# Patient Record
Sex: MALE | Race: WHITE | NOT HISPANIC OR LATINO | Employment: UNEMPLOYED | URBAN - METROPOLITAN AREA
[De-identification: names, ages, dates, MRNs, and addresses within clinical notes are randomized per-mention and may not be internally consistent; named-entity substitution may affect disease eponyms.]

---

## 2020-01-13 ENCOUNTER — CONVERSION ENCOUNTER (OUTPATIENT)
Dept: SURGERY | Facility: CLINIC | Age: 65
End: 2020-01-13

## 2020-01-13 ENCOUNTER — OFFICE VISIT CONVERTED (OUTPATIENT)
Dept: SURGERY | Facility: CLINIC | Age: 65
End: 2020-01-13
Attending: PHYSICIAN ASSISTANT

## 2020-01-14 ENCOUNTER — OFFICE VISIT CONVERTED (OUTPATIENT)
Dept: SURGERY | Facility: CLINIC | Age: 65
End: 2020-01-14
Attending: PHYSICIAN ASSISTANT

## 2020-01-14 ENCOUNTER — HOSPITAL ENCOUNTER (OUTPATIENT)
Dept: SURGERY | Facility: CLINIC | Age: 65
Discharge: HOME | End: 2020-01-14
Attending: PHYSICIAN ASSISTANT

## 2020-01-16 LAB
AMOXICILLIN+CLAV SUSC ISLT: >=32
BACTERIA UR CULT: ABNORMAL
CEFAZOLIN SUSC ISLT: >=64
CEFEPIME SUSC ISLT: <=1
CEFTAZIDIME SUSC ISLT: <=1
CEFTRIAXONE SUSC ISLT: <=1
CEFUROXIME ORAL SUSC ISLT: >=64
CEFUROXIME PARENTER SUSC ISLT: >=64
CIPROFLOXACIN SUSC ISLT: <=0.25
ERTAPENEM SUSC ISLT: <=0.5
GENTAMICIN SUSC ISLT: <=1
LEVOFLOXACIN SUSC ISLT: <=0.12
NITROFURANTOIN SUSC ISLT: 256
TETRACYCLINE SUSC ISLT: 4
TMP SMX SUSC ISLT: <=20
TOBRAMYCIN SUSC ISLT: <=1

## 2020-01-28 ENCOUNTER — HOSPITAL ENCOUNTER (OUTPATIENT)
Dept: SURGERY | Facility: CLINIC | Age: 65
Discharge: HOME | End: 2020-01-28
Attending: PHYSICIAN ASSISTANT

## 2020-01-28 ENCOUNTER — OFFICE VISIT CONVERTED (OUTPATIENT)
Dept: SURGERY | Facility: CLINIC | Age: 65
End: 2020-01-28
Attending: PHYSICIAN ASSISTANT

## 2020-01-28 ENCOUNTER — CONVERSION ENCOUNTER (OUTPATIENT)
Dept: SURGERY | Facility: CLINIC | Age: 65
End: 2020-01-28

## 2020-01-30 LAB — BACTERIA UR CULT: NORMAL

## 2020-02-26 ENCOUNTER — HOSPITAL ENCOUNTER (OUTPATIENT)
Dept: OTHER | Facility: HOSPITAL | Age: 65
Discharge: HOME | End: 2020-02-26
Attending: PHYSICIAN ASSISTANT

## 2020-02-27 LAB
PSA FREE MFR SERPL: 22 %
PSA FREE SERPL-MCNC: 0.44 NG/ML
PSA SERPL-MCNC: 2 NG/ML (ref 0–4)

## 2021-05-15 VITALS — RESPIRATION RATE: 16 BRPM | WEIGHT: 195 LBS | HEIGHT: 75 IN | BODY MASS INDEX: 24.25 KG/M2

## 2021-05-15 VITALS — RESPIRATION RATE: 14 BRPM | HEIGHT: 74 IN | WEIGHT: 203 LBS | BODY MASS INDEX: 26.05 KG/M2

## 2021-05-15 VITALS — HEIGHT: 75 IN | RESPIRATION RATE: 16 BRPM | BODY MASS INDEX: 24.29 KG/M2 | WEIGHT: 195.37 LBS

## 2022-10-03 PROCEDURE — 87077 CULTURE AEROBIC IDENTIFY: CPT | Performed by: EMERGENCY MEDICINE

## 2022-10-03 PROCEDURE — 87186 SC STD MICRODIL/AGAR DIL: CPT | Performed by: EMERGENCY MEDICINE

## 2022-10-03 PROCEDURE — 87086 URINE CULTURE/COLONY COUNT: CPT | Performed by: EMERGENCY MEDICINE

## 2022-10-07 PROCEDURE — 87086 URINE CULTURE/COLONY COUNT: CPT | Performed by: EMERGENCY MEDICINE

## 2022-11-16 ENCOUNTER — LAB (OUTPATIENT)
Dept: LAB | Facility: HOSPITAL | Age: 67
End: 2022-11-16

## 2022-11-16 ENCOUNTER — OFFICE VISIT (OUTPATIENT)
Dept: UROLOGY | Facility: CLINIC | Age: 67
End: 2022-11-16

## 2022-11-16 VITALS — WEIGHT: 208 LBS | HEIGHT: 74 IN | HEART RATE: 72 BPM | BODY MASS INDEX: 26.69 KG/M2

## 2022-11-16 DIAGNOSIS — R35.1 BENIGN PROSTATIC HYPERPLASIA WITH NOCTURIA: ICD-10-CM

## 2022-11-16 DIAGNOSIS — N40.1 BENIGN PROSTATIC HYPERPLASIA WITH NOCTURIA: ICD-10-CM

## 2022-11-16 DIAGNOSIS — R31.0 GROSS HEMATURIA: Primary | ICD-10-CM

## 2022-11-16 DIAGNOSIS — R31.0 GROSS HEMATURIA: ICD-10-CM

## 2022-11-16 LAB
BILIRUB BLD-MCNC: NEGATIVE MG/DL
CLARITY, POC: CLEAR
COLOR UR: ABNORMAL
EXPIRATION DATE: ABNORMAL
GLUCOSE UR STRIP-MCNC: NEGATIVE MG/DL
KETONES UR QL: ABNORMAL
LEUKOCYTE EST, POC: NEGATIVE
Lab: ABNORMAL
NITRITE UR-MCNC: NEGATIVE MG/ML
PH UR: 6 [PH] (ref 5–8)
PROT UR STRIP-MCNC: NEGATIVE MG/DL
PSA SERPL-MCNC: 2.86 NG/ML (ref 0–4)
RBC # UR STRIP: NEGATIVE /UL
SP GR UR: 1.02 (ref 1–1.03)
UROBILINOGEN UR QL: ABNORMAL

## 2022-11-16 PROCEDURE — 36415 COLL VENOUS BLD VENIPUNCTURE: CPT

## 2022-11-16 PROCEDURE — 99203 OFFICE O/P NEW LOW 30 MIN: CPT | Performed by: NURSE PRACTITIONER

## 2022-11-16 PROCEDURE — 87086 URINE CULTURE/COLONY COUNT: CPT | Performed by: NURSE PRACTITIONER

## 2022-11-16 PROCEDURE — 84153 ASSAY OF PSA TOTAL: CPT

## 2022-11-16 PROCEDURE — 81003 URINALYSIS AUTO W/O SCOPE: CPT | Performed by: NURSE PRACTITIONER

## 2022-11-16 PROCEDURE — 51798 US URINE CAPACITY MEASURE: CPT | Performed by: NURSE PRACTITIONER

## 2022-11-16 RX ORDER — TAMSULOSIN HYDROCHLORIDE 0.4 MG/1
1 CAPSULE ORAL DAILY
Qty: 90 CAPSULE | Refills: 0 | Status: SHIPPED | OUTPATIENT
Start: 2022-11-16 | End: 2023-02-07

## 2022-11-16 RX ORDER — MELOXICAM 15 MG/1
15 TABLET ORAL DAILY
COMMUNITY
Start: 2022-11-05

## 2022-11-17 LAB — SPECIMEN VOL SMN: 172 ML

## 2022-11-17 NOTE — PROGRESS NOTES
Chief Complaint: Urinary Tract Infection    Subjective      I have a UTI       History of Present Illness  Dipak Petty is a 67 y.o. male presents to [facility] for UTI.    Patient presents today on referral from Dr. Bhupendra Moses for uti.     Patient admits to urinary frequency and urgency.  Denies any dysuria.    Admits to gross hematuria.    Admits to Nocturia 2X/night.    Patient admits to a weak urinary stream associated with spitting.     Denies any penis or scrotum pain.  Admits to ED issues with initiating and maintaining. Uses sildenafil 20mg at bedtime as needed.     Denies take any blood thinners.    Denies any family history of prostate cancer.  Admits to family history of bladder cancer with his father.    Previous psa's;  12/19: 6.3  6/17: 1.4    Previous cx's:  10/22:100,000 E.coli   10/22: 100,000 Lactose     Results for orders placed or performed in visit on 11/16/22   Urine Culture - Urine, Urine, Clean Catch    Specimen: Urine, Clean Catch   Result Value Ref Range    Urine Culture No growth    Bladder Scan   Result Value Ref Range    Volume: 172    POC Urinalysis Dipstick, Automated    Specimen: Urine   Result Value Ref Range    Color Straw Yellow, Straw, Dark Yellow, Reyna    Clarity, UA Clear Clear    Specific Gravity  1.020 1.005 - 1.030    pH, Urine 6.0 5.0 - 8.0    Leukocytes Negative Negative    Nitrite, UA Negative Negative    Protein, POC Negative Negative mg/dL    Glucose, UA Negative Negative mg/dL    Ketones, UA Trace (A) Negative    Urobilinogen, UA 0.2 E.U./dL Normal, 0.2 E.U./dL    Bilirubin Negative Negative    Blood, UA Negative Negative    Lot Number [id number]     Expiration Date 2,023-11            Objective     Past Medical History:   Diagnosis Date   • Hypertension        Past Surgical History:   Procedure Laterality Date   • BACK SURGERY     • KNEE ARTHROPLASTY, PARTIAL REPLACEMENT Left        Outpatient Medications Marked as Taking for the  "11/16/22 encounter (Office Visit) with Nanci Hernandes APRN   Medication Sig Dispense Refill   • Aspirin Low Dose 81 MG chewable tablet Chew 81 mg Daily.     • B COMPLEX-BIOTIN-FA PO Take  by mouth.     • hydroCHLOROthiazide (HYDRODIURIL) 12.5 MG tablet Take 12.5 mg by mouth Daily.     • lisinopril (PRINIVIL,ZESTRIL) 40 MG tablet Take 1.5 tablets by mouth Daily.     • meloxicam (MOBIC) 15 MG tablet Take 15 mg by mouth Daily. with food         No Known Allergies     History reviewed. No pertinent family history.    Social History     Socioeconomic History   • Marital status:    Tobacco Use   • Smoking status: Some Days     Types: Pipe   • Smokeless tobacco: Never   Vaping Use   • Vaping Use: Never used   Substance and Sexual Activity   • Alcohol use: Not Currently   • Drug use: Never   • Sexual activity: Defer       Review of Systems   Constitutional: Negative for chills and fever.   Genitourinary: Positive for frequency, nocturia, erectile dysfunction and urgency. Negative for decreased urine volume, difficulty urinating, discharge, dysuria, flank pain, genital sores, hematuria, penile pain, penile swelling, scrotal swelling, testicular pain and urinary incontinence.        Vital Signs:   Pulse 72   Ht 188 cm (74\")   Wt 94.3 kg (208 lb)   BMI 26.71 kg/m²      Physical Exam  Vitals reviewed.   Constitutional:       General: He is not in acute distress.     Appearance: Normal appearance.   HENT:      Head: Normocephalic.   Cardiovascular:      Rate and Rhythm: Normal rate.   Pulmonary:      Effort: Pulmonary effort is normal.      Breath sounds: No stridor. No wheezing.   Abdominal:      Palpations: Abdomen is soft.      Tenderness: There is no guarding.   Genitourinary:     Comments: Bladder Scan interpretation     Estimation of residual urine via BVI 3000 Verathon Bladder Scan  MA/nurse performing: Crystal - CMA  Residual Urine: 172 ml  Indication: Gross hematuria  (primary encounter diagnosis)   Position: " Supine  Examination: Incremental scanning of the suprapubic area using 2.0 MHz transducer using copious amounts of acoustic gel.   Findings: An anechoic area was demonstrated which represented the bladder, with measurement of residual urine as noted. I inspected this myself. In that the residual urine was stable or insignificant, refer to plan for treatment and plan necessary at this time.         Musculoskeletal:         General: No swelling. Normal range of motion.   Skin:     General: Skin is warm and dry.      Coloration: Skin is not jaundiced.   Neurological:      General: No focal deficit present.      Mental Status: He is alert and oriented to person, place, and time.   Psychiatric:         Mood and Affect: Mood normal.         Thought Content: Thought content normal.          Result Review :          [x]  Laboratory  []  Radiology  []  Pathology  [x]  Microbiology  []  EKG/Telemetry   []  Cardiology/Vascular   [x]  Old records  Today I have reviewed Migdalia Bond's previous office notes.  I have also reviewed previous PSAs and urinalysis and cultures.     Assessment and Plan    Diagnoses and all orders for this visit:    1. Gross hematuria (Primary)  -     PSA Diagnostic; Future  -     Cystoscopy; Future  -     CT Abdomen Pelvis With & Without Contrast; Future  -     tamsulosin (FLOMAX) 0.4 MG capsule 24 hr capsule; Take 1 capsule by mouth Daily for 90 days.  Dispense: 90 capsule; Refill: 0  -     Urine Culture - Urine, Urine, Clean Catch  -     POC Urinalysis Dipstick, Automated  -     Bladder Scan    2. Benign prostatic hyperplasia with nocturia        Follow Up   Return for ct abd/pelvis; follow-up with me post imaging .     Gross painless hematuria-discussed with patient at length, warrants further work-up.    BPH with Luts- behavioral modifications including fluid management, limiting fluids prior to sleep, and voiding immediately prior to sleep.       Start flomax 0.4 mg tab; side effects discussed;  aware that it may take 3-4 months to have complete effect so encouraged patient to continue to take to ensure adequate trial of medication.    Patient was given instructions and counseling regarding his condition or for health maintenance advice. Please see specific information pulled into the AVS if appropriate.      The office note was dictated with the help of the dragon dictation system.

## 2022-11-18 LAB — BACTERIA SPEC AEROBE CULT: NO GROWTH

## 2022-12-06 ENCOUNTER — HOSPITAL ENCOUNTER (OUTPATIENT)
Dept: CT IMAGING | Facility: HOSPITAL | Age: 67
Discharge: HOME | End: 2022-12-06
Admitting: NURSE PRACTITIONER

## 2022-12-06 DIAGNOSIS — R31.0 GROSS HEMATURIA: ICD-10-CM

## 2022-12-06 LAB
CREAT BLDA-MCNC: 1.3 MG/DL
EGFRCR SERPLBLD CKD-EPI 2021: 61 ML/MIN/1.73

## 2022-12-06 PROCEDURE — 82565 ASSAY OF CREATININE: CPT

## 2022-12-06 PROCEDURE — 0 IOPAMIDOL PER 1 ML: Performed by: NURSE PRACTITIONER

## 2022-12-06 PROCEDURE — 74178 CT ABD&PLV WO CNTR FLWD CNTR: CPT

## 2022-12-06 RX ADMIN — IOPAMIDOL 100 ML: 755 INJECTION, SOLUTION INTRAVENOUS at 17:57

## 2022-12-09 ENCOUNTER — TELEPHONE (OUTPATIENT)
Dept: UROLOGY | Facility: CLINIC | Age: 67
End: 2022-12-09

## 2022-12-09 DIAGNOSIS — N28.89 RENAL MASS: Primary | ICD-10-CM

## 2022-12-09 NOTE — TELEPHONE ENCOUNTER
Pt is aware of MRI on 12/13/22 arrive at 6:30 pm for a 7 pm scan at [facility] and to be NPO 2 hours prior.

## 2022-12-13 ENCOUNTER — HOSPITAL ENCOUNTER (OUTPATIENT)
Dept: MRI IMAGING | Facility: HOSPITAL | Age: 67
Discharge: HOME | End: 2022-12-13
Admitting: NURSE PRACTITIONER

## 2022-12-13 DIAGNOSIS — N28.89 RENAL MASS: ICD-10-CM

## 2022-12-13 PROCEDURE — 74183 MRI ABD W/O CNTR FLWD CNTR: CPT

## 2022-12-13 PROCEDURE — 0 GADOBENATE DIMEGLUMINE 529 MG/ML SOLUTION: Performed by: NURSE PRACTITIONER

## 2022-12-13 PROCEDURE — A9577 INJ MULTIHANCE: HCPCS | Performed by: NURSE PRACTITIONER

## 2022-12-13 RX ADMIN — GADOBENATE DIMEGLUMINE 20 ML: 529 INJECTION, SOLUTION INTRAVENOUS at 19:00

## 2022-12-15 ENCOUNTER — TELEPHONE (OUTPATIENT)
Dept: UROLOGY | Facility: CLINIC | Age: 67
End: 2022-12-15

## 2022-12-15 NOTE — TELEPHONE ENCOUNTER
Pt's wife called to ask if April was going to send in another medication for his prostate. He thought that he was told that she would be sending in a new med when she called him with the results of the MRI.

## 2022-12-16 RX ORDER — FINASTERIDE 5 MG/1
5 TABLET, FILM COATED ORAL DAILY
Qty: 34 TABLET | Refills: 2 | Status: SHIPPED | OUTPATIENT
Start: 2022-12-16 | End: 2023-03-09

## 2022-12-16 NOTE — TELEPHONE ENCOUNTER
April called and talked to the Pt about his MRI and following up with a Cysto with dr. Tristan. Please call the Pt to schedule that.

## 2023-02-03 DIAGNOSIS — R31.0 GROSS HEMATURIA: ICD-10-CM

## 2023-02-07 RX ORDER — TAMSULOSIN HYDROCHLORIDE 0.4 MG/1
CAPSULE ORAL
Qty: 90 CAPSULE | Refills: 0 | Status: SHIPPED | OUTPATIENT
Start: 2023-02-07

## 2023-02-09 PROBLEM — R31.0 GROSS HEMATURIA: Status: ACTIVE | Noted: 2023-02-09

## 2023-02-09 NOTE — PROGRESS NOTES
Chief Complaint: Urologic complaint    Subjective         History of Present Illness  Dipak Petty is a 67 y.o. male    ED  BPH  Gross hematuria      Currently on Flomax 0.4 and finasteride 5 mg daily.    These are helping.  Nocturia x2.  No straining.      No GH      Patient has used sildenafil in the past, they did help.  No side effects.  He was using 100 mg      He did have some gross hematuria with UTI last year.    Patient's been on finasteride since 10/22      12/22 MRI abdomen with and without -simple cyst right kidney accounting for abnormal area on CT.  No suspicious masses.  12/7/2022 CT abdomen/pelvis with and without - delayed phase retirement down - 2 x 2.3 cm right kidney decreased density likely complicated cyst or small mass.      PVR    2/23      123  12/22     172          Father had bladder cancer,   no history of urologic surgery.    No cardiopulmonary history.  Smokes a pipe.  ASA 81          PSA    12/22   2.8  12/20   2.0        Objective     Past Medical History:   Diagnosis Date   • Hypertension        Past Surgical History:   Procedure Laterality Date   • BACK SURGERY     • KNEE ARTHROPLASTY, PARTIAL REPLACEMENT Left          Current Outpatient Medications:   •  Aspirin Low Dose 81 MG chewable tablet, Chew 81 mg Daily., Disp: , Rfl:   •  B COMPLEX-BIOTIN-FA PO, Take  by mouth., Disp: , Rfl:   •  Cholecalciferol 10 MCG (400 UNIT) capsule, Take  by mouth., Disp: , Rfl:   •  ciprofloxacin (CIPRO) 500 MG tablet, Take 1 tablet by mouth 2 (Two) Times a Day., Disp: 20 tablet, Rfl: 0  •  finasteride (PROSCAR) 5 MG tablet, Take 1 tablet by mouth Daily for 90 days., Disp: 34 tablet, Rfl: 2  •  hydroCHLOROthiazide (HYDRODIURIL) 12.5 MG tablet, Take 12.5 mg by mouth Daily., Disp: , Rfl:   •  lisinopril (PRINIVIL,ZESTRIL) 40 MG tablet, Take 1.5 tablets by mouth Daily., Disp: , Rfl:   •  meloxicam (MOBIC) 15 MG tablet, Take 15 mg by mouth Daily. with food, Disp: , Rfl:   •  phenazopyridine (PYRIDIUM)  200 MG tablet, Take 1 tablet by mouth 3 (Three) Times a Day As Needed for Bladder Spasms., Disp: 12 tablet, Rfl: 0  •  sulfamethoxazole-trimethoprim (BACTRIM DS,SEPTRA DS) 800-160 MG per tablet, Take 1 tablet by mouth 2 (Two) Times a Day., Disp: 10 tablet, Rfl: 0  •  tamsulosin (FLOMAX) 0.4 MG capsule 24 hr capsule, Take 1 capsule by mouth Every Night., Disp: 30 capsule, Rfl: 0  •  tamsulosin (FLOMAX) 0.4 MG capsule 24 hr capsule, TAKE 1 CAPSULE BY MOUTH EVERY DAY, Disp: 90 capsule, Rfl: 0    No Known Allergies     No family history on file.    Social History     Socioeconomic History   • Marital status:    Tobacco Use   • Smoking status: Some Days     Types: Pipe   • Smokeless tobacco: Never   Vaping Use   • Vaping Use: Never used   Substance and Sexual Activity   • Alcohol use: Not Currently   • Drug use: Never   • Sexual activity: Defer       Vital Signs:   There were no vitals taken for this visit.     Physical exam    Alert and orient x3  Well appearing, well developed, in no acute distress   Unlabored respirations  Nontender/nondistended    Normal circumcised phallus without mass or tenderness    Grossly oriented to person, place and time, judgment is intact, normal mood and affect    Results for orders placed or performed during the hospital encounter of 12/06/22   POC Creatinine    Specimen: Blood   Result Value Ref Range    Creatinine 1.30 mg/dL    eGFR 61.0 >60.0 mL/min/1.73      Bladder Scan interpretation 02/10/2023    Estimation of residual urine via BVI 3000 Verathon Bladder Scan  MA/nurse performing: Luis Felipe PHILLIPS  Residual Urine: 123 ml  Indication: Gross hematuria    Benign prostatic hyperplasia with lower urinary tract symptoms, symptom details unspecified    Erectile dysfunction due to arterial insufficiency   Position: Supine  Examination: Incremental scanning of the suprapubic area using 2.0 MHz transducer using copious amounts of acoustic gel.   Findings: An anechoic area was demonstrated  which represented the bladder, with measurement of residual urine as noted. I inspected this myself. In that the residual urine was stable or insignificant, refer to plan for treatment and plan necessary at this time.              Assessment and Plan    Diagnoses and all orders for this visit:    1. Gross hematuria (Primary)  -     Cystoscopy         Procedures       Urinalysis was checked today and was negative for signs of infection      Cytoscopy Procedure:     Procedure: Flexible cytoscope was passed per urethra into the bladder without difficulty after proper consent. The bladder was inspected in a systematic meridian fashion.     4 cm prostate    Moderate trabeculation throughout with a large cone-shaped dome with heavy trabeculations in this.      No pathology      There were no tumors, lesions, stones, or other abnormalities noted within the bladder. Of note, there was no increased vascularity as well. Both ureteral orifices were identified and were normal in appearance. The flexible cytoscope was removed. The patient tolerated the procedure well.         ED    Patient has used sildenafil in the past , prescription written today,  risk and benefits discussed.  Patient understands if he has erection greater than 4 hours he should go to the emergency room or risk never having erection again.        Patient also counseled not to take tamsulosin within 4 hours of sildenafil as this could be detriment to his health.      BPH      Continue Flomax 0.4 and finasteride 5.  Patient not bothered currently but still retaining a little urine we discussed this today we did briefly discuss TURP.  Risk and benefits discussed.  At this time patient like to stay on medication we will readdress later this year        Gross hematuria    CT/MRI and cystoscopy okay.  Patient given reassurance no signs of pathology      Follow-up in 10/23 which be 1 year of being on finasteride      PVR follow-up

## 2023-02-10 ENCOUNTER — PROCEDURE VISIT (OUTPATIENT)
Dept: UROLOGY | Facility: CLINIC | Age: 68
End: 2023-02-10
Payer: COMMERCIAL

## 2023-02-10 DIAGNOSIS — R31.0 GROSS HEMATURIA: Primary | ICD-10-CM

## 2023-02-10 DIAGNOSIS — N40.1 BENIGN PROSTATIC HYPERPLASIA WITH LOWER URINARY TRACT SYMPTOMS, SYMPTOM DETAILS UNSPECIFIED: ICD-10-CM

## 2023-02-10 DIAGNOSIS — N52.01 ERECTILE DYSFUNCTION DUE TO ARTERIAL INSUFFICIENCY: ICD-10-CM

## 2023-02-10 LAB — SPECIMEN VOL 24H UR: NORMAL L

## 2023-02-10 PROCEDURE — 51798 US URINE CAPACITY MEASURE: CPT | Performed by: UROLOGY

## 2023-02-10 PROCEDURE — 52000 CYSTOURETHROSCOPY: CPT | Performed by: UROLOGY

## 2023-02-10 PROCEDURE — 99214 OFFICE O/P EST MOD 30 MIN: CPT | Performed by: UROLOGY

## 2023-02-10 RX ORDER — SILDENAFIL CITRATE 20 MG/1
TABLET ORAL
Qty: 30 TABLET | Refills: 10 | Status: SHIPPED | OUTPATIENT
Start: 2023-02-10

## 2023-03-09 RX ORDER — FINASTERIDE 5 MG/1
TABLET, FILM COATED ORAL
Qty: 30 TABLET | Refills: 2 | Status: SHIPPED | OUTPATIENT
Start: 2023-03-09

## 2023-05-13 DIAGNOSIS — R31.0 GROSS HEMATURIA: ICD-10-CM

## 2023-05-18 RX ORDER — TAMSULOSIN HYDROCHLORIDE 0.4 MG/1
CAPSULE ORAL
Qty: 90 CAPSULE | Refills: 0 | Status: SHIPPED | OUTPATIENT
Start: 2023-05-18

## 2023-06-13 RX ORDER — FINASTERIDE 5 MG/1
TABLET, FILM COATED ORAL
Qty: 30 TABLET | Refills: 2 | Status: SHIPPED | OUTPATIENT
Start: 2023-06-13

## 2023-08-14 DIAGNOSIS — N39.0 ACUTE UTI: ICD-10-CM

## 2023-08-18 RX ORDER — TAMSULOSIN HYDROCHLORIDE 0.4 MG/1
CAPSULE ORAL
Qty: 90 CAPSULE | Refills: 0 | Status: SHIPPED | OUTPATIENT
Start: 2023-08-18

## 2023-09-16 DIAGNOSIS — N40.1 BENIGN PROSTATIC HYPERPLASIA WITH LOWER URINARY TRACT SYMPTOMS, SYMPTOM DETAILS UNSPECIFIED: Primary | ICD-10-CM

## 2023-09-21 RX ORDER — FINASTERIDE 5 MG/1
TABLET, FILM COATED ORAL
Qty: 30 TABLET | Refills: 2 | Status: SHIPPED | OUTPATIENT
Start: 2023-09-21

## 2023-10-11 NOTE — PROGRESS NOTES
Chief Complaint: Urologic complaint    Subjective         History of Present Illness  Dipak Petty is a 68 y.o. male      ED  BPH  Gross hematuria      3 UTIs this year.    9/23 UTI-[facility] through [facility]-do not have records        2/23 cystoscopy - 50 g prostate,  4 cm prostate, moderate trabeculation.  Large cone-shaped dome with heavy trabeculations in this.  No pathology    on Flomax 0.4 and finasteride 5 mg daily.    Voiding okay as far as he can tell, no straining.  Nocturia x2-3    Patient is worried about erections    No GH/dysuria      PVR    10/23     000  2/23       123  12/22     172    10/23 IPSS 20, QOL 4      Patient has used sildenafil in the past, they did help.  No side effects.  He was using 100 mg -not working as well as it used to.      Previous         gross hematuria with UTI last year.    Patient's been on finasteride since 10/22      12/22 MRI abdomen with and without -simple cyst right kidney accounting for abnormal area on CT.  No suspicious masses.  12/7/2022 CT abdomen/pelvis with and without - delayed phase [facility] down - 2 x 2.3 cm right kidney decreased density likely complicated cyst or small mass.    Father had bladder cancer,   no history of urologic surgery.    No cardiopulmonary history.  Smokes a pipe.  ASA 83        PSA    12/22   2.8  12/20   2.0        Objective     Past Medical History:   Diagnosis Date    Hypertension        Past Surgical History:   Procedure Laterality Date    BACK SURGERY      KNEE ARTHROPLASTY, PARTIAL REPLACEMENT Left          Current Outpatient Medications:     Aspirin Low Dose 81 MG chewable tablet, Chew 81 mg Daily., Disp: , Rfl:     B COMPLEX-BIOTIN-FA PO, Take  by mouth., Disp: , Rfl:     Cholecalciferol 10 MCG (400 UNIT) capsule, Take  by mouth., Disp: , Rfl:     ciprofloxacin (CIPRO) 500 MG tablet, Take 1 tablet by mouth 2 (Two) Times a Day., Disp: 20 tablet, Rfl: 0    finasteride (PROSCAR) 5 MG tablet, TAKE 1 TABLET BY MOUTH EVERY DAY,  Disp: 30 tablet, Rfl: 2    hydroCHLOROthiazide (HYDRODIURIL) 12.5 MG tablet, Take 12.5 mg by mouth Daily., Disp: , Rfl:     lisinopril (PRINIVIL,ZESTRIL) 40 MG tablet, Take 1.5 tablets by mouth Daily., Disp: , Rfl:     meloxicam (MOBIC) 15 MG tablet, Take 15 mg by mouth Daily. with food, Disp: , Rfl:     phenazopyridine (PYRIDIUM) 200 MG tablet, Take 1 tablet by mouth 3 (Three) Times a Day As Needed for Bladder Spasms., Disp: 12 tablet, Rfl: 0    sulfamethoxazole-trimethoprim (BACTRIM DS,SEPTRA DS) 800-160 MG per tablet, Take 1 tablet by mouth 2 (Two) Times a Day., Disp: 10 tablet, Rfl: 0    tamsulosin (FLOMAX) 0.4 MG capsule 24 hr capsule, TAKE 1 CAPSULE BY MOUTH EVERY DAY, Disp: 90 capsule, Rfl: 0    No Known Allergies     No family history on file.    Social History     Socioeconomic History    Marital status:    Tobacco Use    Smoking status: Some Days     Types: Pipe    Smokeless tobacco: Never   Vaping Use    Vaping Use: Never used   Substance and Sexual Activity    Alcohol use: Not Currently    Drug use: Never    Sexual activity: Defer       Vital Signs:   There were no vitals taken for this visit.         Results for orders placed or performed in visit on 02/10/23   Bladder Scan   Result Value Ref Range    Volume 123ml           Bladder Scan interpretation 10/13/2023    Estimation of residual urine via BVI 3000 Verathon Bladder Scan  MA/nurse performing: Gricelda MARTÍNEZ  Residual Urine: 0 ml  Indication: Benign prostatic hyperplasia with lower urinary tract symptoms, symptom details unspecified    Erectile dysfunction due to arterial insufficiency    Gross hematuria   Position: Supine  Examination: Incremental scanning of the suprapubic area using 2.0 MHz transducer using copious amounts of acoustic gel.   Findings: An anechoic area was demonstrated which represented the bladder, with measurement of residual urine as noted. I inspected this myself. In that the residual urine was stable or insignificant,  refer to plan for treatment and plan necessary at this time.            Assessment and Plan    Diagnoses and all orders for this visit:    1. Benign prostatic hyperplasia with lower urinary tract symptoms, symptom details unspecified (Primary)    2. Erectile dysfunction due to arterial insufficiency    3. Gross hematuria              ED    Patient wanted to try something different we will go ahead and try him on tadalafil 20 mg 1 tab as needed since requested risk and benefits discussed.  Patient understands to get erection for greater than 4 hours he should go to emergency room and was found to have erection good.          BPH      Continue Flomax 0.4 and finasteride 5.        Discussed Aquablation today.  Discussed 1% risk of incontinence   risks and benefits were discussed including bleeding, infection and damage to the urinary system.  We also discussed the risk of anesthesia up to and including death.  Patient voiced understanding and would like to proceed.

## 2023-10-13 ENCOUNTER — PREP FOR SURGERY (OUTPATIENT)
Dept: OTHER | Facility: HOSPITAL | Age: 68
End: 2023-10-13
Payer: COMMERCIAL

## 2023-10-13 ENCOUNTER — OFFICE VISIT (OUTPATIENT)
Dept: UROLOGY | Facility: CLINIC | Age: 68
End: 2023-10-13
Payer: COMMERCIAL

## 2023-10-13 VITALS — HEIGHT: 74 IN | WEIGHT: 208.4 LBS | RESPIRATION RATE: 16 BRPM | BODY MASS INDEX: 26.75 KG/M2

## 2023-10-13 DIAGNOSIS — N52.01 ERECTILE DYSFUNCTION DUE TO ARTERIAL INSUFFICIENCY: ICD-10-CM

## 2023-10-13 DIAGNOSIS — N40.1 BPH WITH OBSTRUCTION/LOWER URINARY TRACT SYMPTOMS: Primary | ICD-10-CM

## 2023-10-13 DIAGNOSIS — R31.0 GROSS HEMATURIA: Primary | ICD-10-CM

## 2023-10-13 DIAGNOSIS — N13.8 BPH WITH OBSTRUCTION/LOWER URINARY TRACT SYMPTOMS: Primary | ICD-10-CM

## 2023-10-13 DIAGNOSIS — N40.1 BENIGN PROSTATIC HYPERPLASIA WITH LOWER URINARY TRACT SYMPTOMS, SYMPTOM DETAILS UNSPECIFIED: ICD-10-CM

## 2023-10-13 LAB
BILIRUB BLD-MCNC: NEGATIVE MG/DL
CLARITY, POC: CLEAR
COLOR UR: YELLOW
EXPIRATION DATE: ABNORMAL
GLUCOSE UR STRIP-MCNC: NEGATIVE MG/DL
KETONES UR QL: NEGATIVE
LEUKOCYTE EST, POC: NEGATIVE
Lab: ABNORMAL
NITRITE UR-MCNC: NEGATIVE MG/ML
PH UR: 7 [PH] (ref 5–8)
PROT UR STRIP-MCNC: ABNORMAL MG/DL
RBC # UR STRIP: NEGATIVE /UL
SP GR UR: 1.01 (ref 1–1.03)
SPECIMEN VOL 24H UR: 0 L
UROBILINOGEN UR QL: ABNORMAL

## 2023-10-13 RX ORDER — LEVOFLOXACIN 5 MG/ML
500 INJECTION, SOLUTION INTRAVENOUS ONCE
OUTPATIENT
Start: 2023-10-13 | End: 2023-10-13

## 2023-10-13 RX ORDER — SODIUM CHLORIDE 9 MG/ML
100 INJECTION, SOLUTION INTRAVENOUS CONTINUOUS
OUTPATIENT
Start: 2023-10-13

## 2023-10-13 RX ORDER — SODIUM CHLORIDE 0.9 % (FLUSH) 0.9 %
3 SYRINGE (ML) INJECTION EVERY 12 HOURS SCHEDULED
OUTPATIENT
Start: 2023-10-13

## 2023-10-13 RX ORDER — SODIUM CHLORIDE 9 MG/ML
40 INJECTION, SOLUTION INTRAVENOUS AS NEEDED
OUTPATIENT
Start: 2023-10-13

## 2023-10-13 RX ORDER — SODIUM CHLORIDE 0.9 % (FLUSH) 0.9 %
10 SYRINGE (ML) INJECTION AS NEEDED
OUTPATIENT
Start: 2023-10-13

## 2023-10-13 RX ORDER — TADALAFIL 20 MG/1
20 TABLET ORAL AS NEEDED
Qty: 5 TABLET | Refills: 5 | Status: SHIPPED | OUTPATIENT
Start: 2023-10-13

## 2023-11-21 DIAGNOSIS — N39.0 ACUTE UTI: ICD-10-CM

## 2023-11-22 ENCOUNTER — PRE-ADMISSION TESTING (OUTPATIENT)
Dept: PREADMISSION TESTING | Facility: HOSPITAL | Age: 68
End: 2023-11-22
Payer: COMMERCIAL

## 2023-11-22 ENCOUNTER — TELEPHONE (OUTPATIENT)
Dept: UROLOGY | Facility: CLINIC | Age: 68
End: 2023-11-22
Payer: COMMERCIAL

## 2023-11-22 VITALS
DIASTOLIC BLOOD PRESSURE: 74 MMHG | SYSTOLIC BLOOD PRESSURE: 136 MMHG | HEIGHT: 75 IN | WEIGHT: 211.2 LBS | OXYGEN SATURATION: 98 % | RESPIRATION RATE: 16 BRPM | TEMPERATURE: 98.2 F | BODY MASS INDEX: 26.26 KG/M2 | HEART RATE: 62 BPM

## 2023-11-22 DIAGNOSIS — Z01.818 PREOP TESTING: Primary | ICD-10-CM

## 2023-11-22 DIAGNOSIS — N40.1 BENIGN PROSTATIC HYPERPLASIA WITH LOWER URINARY TRACT SYMPTOMS, SYMPTOM DETAILS UNSPECIFIED: ICD-10-CM

## 2023-11-22 LAB
ANION GAP SERPL CALCULATED.3IONS-SCNC: 8.9 MMOL/L (ref 5–15)
BUN SERPL-MCNC: 23 MG/DL (ref 8–23)
BUN/CREAT SERPL: 18.5 (ref 7–25)
CALCIUM SPEC-SCNC: 9.2 MG/DL (ref 8.6–10.5)
CHLORIDE SERPL-SCNC: 103 MMOL/L (ref 98–107)
CO2 SERPL-SCNC: 26.1 MMOL/L (ref 22–29)
CREAT SERPL-MCNC: 1.24 MG/DL (ref 0.76–1.27)
EGFRCR SERPLBLD CKD-EPI 2021: 64.1 ML/MIN/1.73
GLUCOSE SERPL-MCNC: 87 MG/DL (ref 65–99)
POTASSIUM SERPL-SCNC: 4.1 MMOL/L (ref 3.5–5.2)
QT INTERVAL: 395 MS
QTC INTERVAL: 412 MS
SODIUM SERPL-SCNC: 138 MMOL/L (ref 136–145)

## 2023-11-22 PROCEDURE — 93005 ELECTROCARDIOGRAM TRACING: CPT

## 2023-11-22 PROCEDURE — 87086 URINE CULTURE/COLONY COUNT: CPT

## 2023-11-22 PROCEDURE — 36415 COLL VENOUS BLD VENIPUNCTURE: CPT

## 2023-11-22 PROCEDURE — 80048 BASIC METABOLIC PNL TOTAL CA: CPT

## 2023-11-22 RX ORDER — TAMSULOSIN HYDROCHLORIDE 0.4 MG/1
CAPSULE ORAL
Qty: 90 CAPSULE | Refills: 0 | Status: SHIPPED | OUTPATIENT
Start: 2023-11-22

## 2023-11-22 NOTE — DISCHARGE INSTRUCTIONS
IMPORTANT INSTRUCTIONS - PRE-ADMISSION TESTING  DO NOT EAT OR CHEW anything after midnight the night before your procedure.    You may have CLEAR liquids up to __2____ hours prior to ARRIVAL time.   Take the following medications the morning of your procedure with JUST A SIP OF WATER:       FLOMAX, PROSCAR    DO NOT BRING your medications to the hospital with you, UNLESS something has changed since your PRE-Admission Testing appointment.  Hold all vitamins, supplements, and NSAIDS (Non- steroidal anti-inflammatory meds) for one week prior to surgery (you MAY take Tylenol or Acetaminophen).                STOP 12-29-23: VIT B COMPLEX   If you are diabetic, check your blood sugar the morning of your procedure. If it is less than 70 or if you are feeling symptomatic, call the following number for further instructions: [phone].  Use your inhalers/nebulizers as usual, the morning of your procedure. BRING YOUR INHALERS with you.   Bring your CPAP or BIPAP to hospital, ONLY IF YOU WILL BE SPENDING THE NIGHT.   Make sure you have a ride home and have someone who will stay with you the day of your procedure after you go home.  If you have any questions, please call your Pre-Admission Testing Nurse, ___VIK BULLARD_ at [phone].   Per anesthesia request, do not smoke for 24 hours before your procedure or as instructed by your surgeon.      SURGERY 11-7-23 ELEVATOR A, THIRD FLOOR  WILL CALL THE ARRIVAL TIME AFTER 1PM THE DAY PRIOR TO SURGERY  FOLLOW ANY INSTRUCTIONS ANY INSTRUCTIONS FROM SURGEON'S OFFICE.  PER SURGEON OFFICE STOP ASA 1 WEEK PRIOR TO SURGERY LAST DOSE 11-29-23    PREOPERATIVE (BEFORE SURGERY)              BATHING INSTRUCTIONS  Instructions:    You will need to shower 1 separate times utilizing the soap provided; at the times indicated   below:     12-6-23 WED PM        AND   12-7-23 THUR AM     Wash your hair and face with normal shampoo and soap, rinse it well before using the surgical soap.      In the  MEDICATIONS:  · See Medication List (bring to your doctor appointments).  · Other:      VACCINES:  Most Recent Immunizations   Administered Date(s) Administered   • Influenza, Unspecified Formulation 11/12/2014       ACTIVITY:  · Weigh yourself daily (first thing in the morning, with same amount of clothes on) unless told otherwise by your doctor.  · Continue activity as you were in the hospital, slowly increase to what you were doing previously.  · Up as desired / no restrictions.    SMOKING:  · Avoid all tobacco products and secondhand smoke.  · Smoking Cessation Counseling offered.  · [place] Toll Free Quit Line: [phone]    DIET:  · Limit salt and salty foods unless told otherwise by your doctor.  · No Restrictions    · Trouble breathing or chest pain - CALL 911    CONTACT YOUR DOCTOR IF:  · You have symptoms that are not \"normal\" for you.  · You have new or worse symptoms or pain, not relieved by medicine or rest.  · Temperature greater than 101 degrees F, chills or flu like symptoms.  · You gain more than 3 pounds in 2 days.  · Increased swelling, redness or drainage at IV site.    REFERRALS (Type/Agency/Phone):  · Other:      shower, wet the skin completely with water from your neck to your feet. Apply the cleanser to your   body ONLY FROM THE NECK TO YOUR FEET.     Do NOT USE THE CLEANSER ON YOUR FACE, HEAD, OR GENITAL (PRIVATE) AREAS.   Keep it out of your eyes, ears, and mouth because of the risk of injury to those areas.      Scrub with a clean washcloth for each bath utilizing the soap provided from the top of your body to the   bottom starting at the neck area.      Pay close attention to your armpits, groin area, and the site of surgery.      Wash your body gently for 5 minutes. Stand outside the stream or turn off the water while scrubbing your   body. Do NOT wash with your regular soap after the surgical cleanser is used.      RINSE THE CLEANSER OFF COMPLETELY with plenty of water. Rinse the area again thoroughly.      Dry off with a clean towel. The surgical soap can cause dryness; however do NOT APPLY LOTION,   CREAM, POWDER, and/or DEODORANT AFTER SHOWERING.     Be sure to where clean clothes after showering.      Ensure CLEAN BED LINENS AFTER FIRST wash with the surgical soap. - WED PM     NO PETS ALLOWED IN THE BED with you after utilizing the surgical soap.    Clear Liquid Diet        Find out when you need to start a clear liquid diet.   Think of “clear liquids” as anything you could read a newspaper through. This includes things like water, broth, sports drinks, or tea WITHOUT any kind of milk or cream.           Once you are told to start a clear liquid diet, only drink these things until 2 hours before arrival to the hospital or when the hospital says to stop. Total volume limitation: 8 oz.       Clear liquids you CAN drink:   Water   Clear broth: beef, chicken, vegetable, or bone broth with nothing in it   Gatorade   Lemonade or Ramon-aid   Soda   Tea, coffee (NO cream or honey)   Jell-O (without fruit)   Popsicles (without fruit or cream)   Italian ices   Juice without pulp: apple, white, grape   You may use salt,  pepper, and sugar  NO RED  NO NOODLES    Do NOT drink:   Milk or cream   Soy milk, almond milk, coconut milk, or other non-dairy drinks and   creamers   Milkshakes or smoothies   Tomato juice   Orange juice   Grapefruit juice   Cream soups or any other than broth         Clear Liquid Diet:  Do NOT eat any solid food.  Do NOT eat or suck on mints or candy.  Do NOT chew gum.  Do NOT drink thick liquids like milk or juice with pulp in it.  Do NOT add milk, cream, or anything like soy milk or almond milk to coffee or tea.

## 2023-11-22 NOTE — TELEPHONE ENCOUNTER
VIK CALLED FROM [facility].  PATIENT IS SCHEDULED FOR AQUABLATION ON 12/07/23.  HE NEEDS TO KNOW WHEN TO STOP HIS 81 MG ASPIRIN.

## 2023-11-22 NOTE — NURSING NOTE
CALLED DR COFFMAN OFFICE, NOTIFIED SAUNDRA OF ABNORMAL UA (TRACE PROTEIN), ALSO ASKED WHEN TO STOP ASA FOR SURGERY   AWAITING RESPONSE.

## 2023-11-23 LAB — BACTERIA SPEC AEROBE CULT: NO GROWTH

## 2023-11-30 ENCOUNTER — TRANSCRIBE ORDERS (OUTPATIENT)
Dept: LAB | Facility: HOSPITAL | Age: 68
End: 2023-11-30
Payer: COMMERCIAL

## 2023-11-30 ENCOUNTER — LAB (OUTPATIENT)
Dept: LAB | Facility: HOSPITAL | Age: 68
End: 2023-11-30
Payer: COMMERCIAL

## 2023-11-30 DIAGNOSIS — J30.89 OTHER ALLERGIC RHINITIS: ICD-10-CM

## 2023-11-30 DIAGNOSIS — J30.1 ALLERGIC RHINITIS DUE TO POLLEN, UNSPECIFIED SEASONALITY: ICD-10-CM

## 2023-11-30 DIAGNOSIS — J30.1 ALLERGIC RHINITIS DUE TO POLLEN, UNSPECIFIED SEASONALITY: Primary | ICD-10-CM

## 2023-11-30 LAB
ALBUMIN SERPL-MCNC: 4.1 G/DL (ref 3.5–5.2)
ALBUMIN/GLOB SERPL: 1.3 G/DL
ALP SERPL-CCNC: 85 U/L (ref 39–117)
ALT SERPL W P-5'-P-CCNC: 17 U/L (ref 1–41)
ANION GAP SERPL CALCULATED.3IONS-SCNC: 11 MMOL/L (ref 5–15)
AST SERPL-CCNC: 25 U/L (ref 1–40)
BASOPHILS # BLD AUTO: 0.04 10*3/MM3 (ref 0–0.2)
BASOPHILS NFR BLD AUTO: 0.7 % (ref 0–1.5)
BILIRUB SERPL-MCNC: <0.2 MG/DL (ref 0–1.2)
BUN SERPL-MCNC: 26 MG/DL (ref 8–23)
BUN/CREAT SERPL: 17.6 (ref 7–25)
C4 SERPL-MCNC: 26 MG/DL (ref 14–44)
CALCIUM SPEC-SCNC: 9.7 MG/DL (ref 8.6–10.5)
CHLORIDE SERPL-SCNC: 104 MMOL/L (ref 98–107)
CO2 SERPL-SCNC: 27 MMOL/L (ref 22–29)
CREAT SERPL-MCNC: 1.48 MG/DL (ref 0.76–1.27)
DEPRECATED RDW RBC AUTO: 45.2 FL (ref 37–54)
EGFRCR SERPLBLD CKD-EPI 2021: 51.9 ML/MIN/1.73
EOSINOPHIL # BLD AUTO: 0.15 10*3/MM3 (ref 0–0.4)
EOSINOPHIL NFR BLD AUTO: 2.6 % (ref 0.3–6.2)
ERYTHROCYTE [DISTWIDTH] IN BLOOD BY AUTOMATED COUNT: 13.1 % (ref 12.3–15.4)
ERYTHROCYTE [SEDIMENTATION RATE] IN BLOOD: 7 MM/HR (ref 0–20)
GLOBULIN UR ELPH-MCNC: 3.2 GM/DL
GLUCOSE SERPL-MCNC: 90 MG/DL (ref 65–99)
HCT VFR BLD AUTO: 40.4 % (ref 37.5–51)
HGB BLD-MCNC: 13.7 G/DL (ref 13–17.7)
IMM GRANULOCYTES # BLD AUTO: 0.01 10*3/MM3 (ref 0–0.05)
IMM GRANULOCYTES NFR BLD AUTO: 0.2 % (ref 0–0.5)
LYMPHOCYTES # BLD AUTO: 1.39 10*3/MM3 (ref 0.7–3.1)
LYMPHOCYTES NFR BLD AUTO: 23.7 % (ref 19.6–45.3)
MCH RBC QN AUTO: 31.8 PG (ref 26.6–33)
MCHC RBC AUTO-ENTMCNC: 33.9 G/DL (ref 31.5–35.7)
MCV RBC AUTO: 93.7 FL (ref 79–97)
MONOCYTES # BLD AUTO: 0.4 10*3/MM3 (ref 0.1–0.9)
MONOCYTES NFR BLD AUTO: 6.8 % (ref 5–12)
NEUTROPHILS NFR BLD AUTO: 3.87 10*3/MM3 (ref 1.7–7)
NEUTROPHILS NFR BLD AUTO: 66 % (ref 42.7–76)
NRBC BLD AUTO-RTO: 0 /100 WBC (ref 0–0.2)
PLATELET # BLD AUTO: 296 10*3/MM3 (ref 140–450)
PMV BLD AUTO: 10.8 FL (ref 6–12)
POTASSIUM SERPL-SCNC: 4.1 MMOL/L (ref 3.5–5.2)
PROT SERPL-MCNC: 7.3 G/DL (ref 6–8.5)
RBC # BLD AUTO: 4.31 10*6/MM3 (ref 4.14–5.8)
SODIUM SERPL-SCNC: 142 MMOL/L (ref 136–145)
WBC NRBC COR # BLD AUTO: 5.86 10*3/MM3 (ref 3.4–10.8)

## 2023-11-30 PROCEDURE — 83520 IMMUNOASSAY QUANT NOS NONAB: CPT

## 2023-11-30 PROCEDURE — 36415 COLL VENOUS BLD VENIPUNCTURE: CPT

## 2023-11-30 PROCEDURE — 85652 RBC SED RATE AUTOMATED: CPT

## 2023-11-30 PROCEDURE — 86160 COMPLEMENT ANTIGEN: CPT

## 2023-11-30 PROCEDURE — 85025 COMPLETE CBC W/AUTO DIFF WBC: CPT

## 2023-11-30 PROCEDURE — 86161 COMPLEMENT/FUNCTION ACTIVITY: CPT

## 2023-11-30 PROCEDURE — 80053 COMPREHEN METABOLIC PANEL: CPT

## 2023-12-03 LAB — TRYPTASE SERPL-MCNC: 7.3 UG/L (ref 2.2–13.2)

## 2023-12-04 LAB — C1INH ACT/NOR SERPL: 77 %MEAN NORMAL

## 2023-12-05 LAB
QT INTERVAL: 395 MS
QTC INTERVAL: 412 MS

## 2023-12-07 ENCOUNTER — ANESTHESIA EVENT (OUTPATIENT)
Dept: PERIOP | Facility: HOSPITAL | Age: 68
End: 2023-12-07
Payer: COMMERCIAL

## 2023-12-07 ENCOUNTER — ANESTHESIA (OUTPATIENT)
Dept: PERIOP | Facility: HOSPITAL | Age: 68
End: 2023-12-07
Payer: COMMERCIAL

## 2023-12-07 ENCOUNTER — HOSPITAL ENCOUNTER (OUTPATIENT)
Facility: HOSPITAL | Age: 68
Discharge: HOME | End: 2023-12-08
Attending: UROLOGY | Admitting: UROLOGY
Payer: COMMERCIAL

## 2023-12-07 DIAGNOSIS — N40.1 BPH WITH OBSTRUCTION/LOWER URINARY TRACT SYMPTOMS: ICD-10-CM

## 2023-12-07 DIAGNOSIS — N13.8 BPH WITH OBSTRUCTION/LOWER URINARY TRACT SYMPTOMS: ICD-10-CM

## 2023-12-07 PROCEDURE — 0421T PR TRANSURETHRAL WATERJET ABLATION PROSTATE COMPL: CPT | Performed by: UROLOGY

## 2023-12-07 PROCEDURE — 25010000002 ONDANSETRON PER 1 MG: Performed by: NURSE ANESTHETIST, CERTIFIED REGISTERED

## 2023-12-07 PROCEDURE — 25810000003 LACTATED RINGERS PER 1000 ML: Performed by: ANESTHESIOLOGY

## 2023-12-07 PROCEDURE — 25010000002 PROPOFOL 10 MG/ML EMULSION: Performed by: NURSE ANESTHETIST, CERTIFIED REGISTERED

## 2023-12-07 PROCEDURE — 25010000002 DEXAMETHASONE PER 1 MG: Performed by: NURSE ANESTHETIST, CERTIFIED REGISTERED

## 2023-12-07 PROCEDURE — 88305 TISSUE EXAM BY PATHOLOGIST: CPT | Performed by: UROLOGY

## 2023-12-07 PROCEDURE — 25010000002 MIDAZOLAM PER 1MG: Performed by: ANESTHESIOLOGY

## 2023-12-07 PROCEDURE — 25010000002 SUGAMMADEX 200 MG/2ML SOLUTION: Performed by: NURSE ANESTHETIST, CERTIFIED REGISTERED

## 2023-12-07 PROCEDURE — 25010000002 FENTANYL CITRATE (PF) 50 MCG/ML SOLUTION: Performed by: NURSE ANESTHETIST, CERTIFIED REGISTERED

## 2023-12-07 PROCEDURE — 25010000002 KETOROLAC TROMETHAMINE PER 15 MG: Performed by: NURSE ANESTHETIST, CERTIFIED REGISTERED

## 2023-12-07 PROCEDURE — 25810000003 SODIUM CHLORIDE 0.9 % SOLUTION: Performed by: UROLOGY

## 2023-12-07 PROCEDURE — 94799 UNLISTED PULMONARY SVC/PX: CPT

## 2023-12-07 PROCEDURE — 25010000002 LEVOFLOXACIN PER 250 MG: Performed by: UROLOGY

## 2023-12-07 PROCEDURE — C2596 PROBE, ROBOTIC, WATER-JET: HCPCS | Performed by: UROLOGY

## 2023-12-07 RX ORDER — CETIRIZINE HYDROCHLORIDE 10 MG/1
10 TABLET ORAL DAILY
Status: DISCONTINUED | OUTPATIENT
Start: 2023-12-07 | End: 2023-12-08 | Stop reason: HOSPADM

## 2023-12-07 RX ORDER — SODIUM CHLORIDE 0.9 % (FLUSH) 0.9 %
3 SYRINGE (ML) INJECTION EVERY 12 HOURS SCHEDULED
Status: DISCONTINUED | OUTPATIENT
Start: 2023-12-07 | End: 2023-12-07 | Stop reason: HOSPADM

## 2023-12-07 RX ORDER — PROMETHAZINE HYDROCHLORIDE 25 MG/1
25 SUPPOSITORY RECTAL ONCE AS NEEDED
Status: DISCONTINUED | OUTPATIENT
Start: 2023-12-07 | End: 2023-12-07 | Stop reason: HOSPADM

## 2023-12-07 RX ORDER — ROCURONIUM BROMIDE 10 MG/ML
INJECTION, SOLUTION INTRAVENOUS AS NEEDED
Status: DISCONTINUED | OUTPATIENT
Start: 2023-12-07 | End: 2023-12-07 | Stop reason: SURG

## 2023-12-07 RX ORDER — HYDROCHLOROTHIAZIDE 12.5 MG/1
12.5 TABLET ORAL DAILY
Status: DISCONTINUED | OUTPATIENT
Start: 2023-12-08 | End: 2023-12-08 | Stop reason: HOSPADM

## 2023-12-07 RX ORDER — SODIUM CHLORIDE 9 MG/ML
100 INJECTION, SOLUTION INTRAVENOUS CONTINUOUS
Status: DISCONTINUED | OUTPATIENT
Start: 2023-12-07 | End: 2023-12-07

## 2023-12-07 RX ORDER — ONDANSETRON 2 MG/ML
4 INJECTION INTRAMUSCULAR; INTRAVENOUS EVERY 6 HOURS PRN
Status: DISCONTINUED | OUTPATIENT
Start: 2023-12-07 | End: 2023-12-08 | Stop reason: HOSPADM

## 2023-12-07 RX ORDER — DEXAMETHASONE SODIUM PHOSPHATE 4 MG/ML
INJECTION, SOLUTION INTRA-ARTICULAR; INTRALESIONAL; INTRAMUSCULAR; INTRAVENOUS; SOFT TISSUE AS NEEDED
Status: DISCONTINUED | OUTPATIENT
Start: 2023-12-07 | End: 2023-12-07 | Stop reason: SURG

## 2023-12-07 RX ORDER — EPINEPHRINE 0.3 MG/.3ML
0.3 INJECTION SUBCUTANEOUS ONCE AS NEEDED
Status: DISCONTINUED | OUTPATIENT
Start: 2023-12-07 | End: 2023-12-08 | Stop reason: HOSPADM

## 2023-12-07 RX ORDER — MEPERIDINE HYDROCHLORIDE 25 MG/ML
12.5 INJECTION INTRAMUSCULAR; INTRAVENOUS; SUBCUTANEOUS
Status: DISCONTINUED | OUTPATIENT
Start: 2023-12-07 | End: 2023-12-07 | Stop reason: HOSPADM

## 2023-12-07 RX ORDER — MELOXICAM 15 MG/1
15 TABLET ORAL DAILY
Status: ON HOLD | COMMUNITY
Start: 2023-11-24 | End: 2023-12-07

## 2023-12-07 RX ORDER — LISINOPRIL 20 MG/1
60 TABLET ORAL DAILY
Status: DISCONTINUED | OUTPATIENT
Start: 2023-12-08 | End: 2023-12-08 | Stop reason: HOSPADM

## 2023-12-07 RX ORDER — CETIRIZINE HYDROCHLORIDE 10 MG/1
1 TABLET ORAL DAILY
COMMUNITY
Start: 2023-12-04

## 2023-12-07 RX ORDER — EPHEDRINE SULFATE 50 MG/ML
INJECTION, SOLUTION INTRAVENOUS AS NEEDED
Status: DISCONTINUED | OUTPATIENT
Start: 2023-12-07 | End: 2023-12-07 | Stop reason: SURG

## 2023-12-07 RX ORDER — PROMETHAZINE HYDROCHLORIDE 12.5 MG/1
25 TABLET ORAL ONCE AS NEEDED
Status: DISCONTINUED | OUTPATIENT
Start: 2023-12-07 | End: 2023-12-07 | Stop reason: HOSPADM

## 2023-12-07 RX ORDER — OXYCODONE HYDROCHLORIDE 5 MG/1
5 TABLET ORAL
Status: DISCONTINUED | OUTPATIENT
Start: 2023-12-07 | End: 2023-12-07 | Stop reason: HOSPADM

## 2023-12-07 RX ORDER — PROPOFOL 10 MG/ML
VIAL (ML) INTRAVENOUS AS NEEDED
Status: DISCONTINUED | OUTPATIENT
Start: 2023-12-07 | End: 2023-12-07 | Stop reason: SURG

## 2023-12-07 RX ORDER — FENTANYL CITRATE 50 UG/ML
INJECTION, SOLUTION INTRAMUSCULAR; INTRAVENOUS AS NEEDED
Status: DISCONTINUED | OUTPATIENT
Start: 2023-12-07 | End: 2023-12-07 | Stop reason: SURG

## 2023-12-07 RX ORDER — MONTELUKAST SODIUM 10 MG/1
10 TABLET ORAL NIGHTLY
Status: DISCONTINUED | OUTPATIENT
Start: 2023-12-07 | End: 2023-12-08 | Stop reason: HOSPADM

## 2023-12-07 RX ORDER — SODIUM CHLORIDE, SODIUM LACTATE, POTASSIUM CHLORIDE, CALCIUM CHLORIDE 600; 310; 30; 20 MG/100ML; MG/100ML; MG/100ML; MG/100ML
9 INJECTION, SOLUTION INTRAVENOUS CONTINUOUS PRN
Status: DISCONTINUED | OUTPATIENT
Start: 2023-12-07 | End: 2023-12-07 | Stop reason: HOSPADM

## 2023-12-07 RX ORDER — EPINEPHRINE 0.3 MG/.3ML
0.3 INJECTION SUBCUTANEOUS SEE ADMIN INSTRUCTIONS
COMMUNITY
Start: 2023-12-04

## 2023-12-07 RX ORDER — SODIUM CHLORIDE 9 MG/ML
40 INJECTION, SOLUTION INTRAVENOUS AS NEEDED
Status: DISCONTINUED | OUTPATIENT
Start: 2023-12-07 | End: 2023-12-08 | Stop reason: HOSPADM

## 2023-12-07 RX ORDER — LEVOFLOXACIN 5 MG/ML
500 INJECTION, SOLUTION INTRAVENOUS ONCE
Status: COMPLETED | OUTPATIENT
Start: 2023-12-07 | End: 2023-12-07

## 2023-12-07 RX ORDER — ONDANSETRON 2 MG/ML
INJECTION INTRAMUSCULAR; INTRAVENOUS AS NEEDED
Status: DISCONTINUED | OUTPATIENT
Start: 2023-12-07 | End: 2023-12-07 | Stop reason: SURG

## 2023-12-07 RX ORDER — SODIUM CHLORIDE 0.9 % (FLUSH) 0.9 %
10 SYRINGE (ML) INJECTION EVERY 12 HOURS SCHEDULED
Status: DISCONTINUED | OUTPATIENT
Start: 2023-12-07 | End: 2023-12-08 | Stop reason: HOSPADM

## 2023-12-07 RX ORDER — ONDANSETRON 2 MG/ML
4 INJECTION INTRAMUSCULAR; INTRAVENOUS ONCE AS NEEDED
Status: DISCONTINUED | OUTPATIENT
Start: 2023-12-07 | End: 2023-12-07 | Stop reason: HOSPADM

## 2023-12-07 RX ORDER — MONTELUKAST SODIUM 10 MG/1
10 TABLET ORAL
COMMUNITY
Start: 2023-12-04

## 2023-12-07 RX ORDER — ACETAMINOPHEN 650 MG/1
650 SUPPOSITORY RECTAL EVERY 4 HOURS PRN
Status: DISCONTINUED | OUTPATIENT
Start: 2023-12-07 | End: 2023-12-08 | Stop reason: HOSPADM

## 2023-12-07 RX ORDER — SODIUM CHLORIDE 0.9 % (FLUSH) 0.9 %
10 SYRINGE (ML) INJECTION AS NEEDED
Status: DISCONTINUED | OUTPATIENT
Start: 2023-12-07 | End: 2023-12-08 | Stop reason: HOSPADM

## 2023-12-07 RX ORDER — ONDANSETRON 4 MG/1
4 TABLET, FILM COATED ORAL EVERY 6 HOURS PRN
Status: DISCONTINUED | OUTPATIENT
Start: 2023-12-07 | End: 2023-12-08 | Stop reason: HOSPADM

## 2023-12-07 RX ORDER — SODIUM CHLORIDE 9 MG/ML
60 INJECTION, SOLUTION INTRAVENOUS CONTINUOUS
Status: DISCONTINUED | OUTPATIENT
Start: 2023-12-07 | End: 2023-12-08 | Stop reason: HOSPADM

## 2023-12-07 RX ORDER — HYDRALAZINE HYDROCHLORIDE 20 MG/ML
10 INJECTION INTRAMUSCULAR; INTRAVENOUS EVERY 6 HOURS PRN
Status: DISCONTINUED | OUTPATIENT
Start: 2023-12-07 | End: 2023-12-08 | Stop reason: HOSPADM

## 2023-12-07 RX ORDER — OXYCODONE HYDROCHLORIDE 5 MG/1
5 TABLET ORAL EVERY 4 HOURS PRN
Status: DISCONTINUED | OUTPATIENT
Start: 2023-12-07 | End: 2023-12-08 | Stop reason: HOSPADM

## 2023-12-07 RX ORDER — MAGNESIUM HYDROXIDE 1200 MG/15ML
LIQUID ORAL AS NEEDED
Status: DISCONTINUED | OUTPATIENT
Start: 2023-12-07 | End: 2023-12-07 | Stop reason: HOSPADM

## 2023-12-07 RX ORDER — LIDOCAINE HYDROCHLORIDE 20 MG/ML
INJECTION, SOLUTION EPIDURAL; INFILTRATION; INTRACAUDAL; PERINEURAL AS NEEDED
Status: DISCONTINUED | OUTPATIENT
Start: 2023-12-07 | End: 2023-12-07 | Stop reason: SURG

## 2023-12-07 RX ORDER — ACETAMINOPHEN 325 MG/1
650 TABLET ORAL EVERY 4 HOURS PRN
Status: DISCONTINUED | OUTPATIENT
Start: 2023-12-07 | End: 2023-12-08 | Stop reason: HOSPADM

## 2023-12-07 RX ORDER — ACETAMINOPHEN 500 MG
1000 TABLET ORAL ONCE
Status: COMPLETED | OUTPATIENT
Start: 2023-12-07 | End: 2023-12-07

## 2023-12-07 RX ORDER — DOCUSATE SODIUM 100 MG/1
100 CAPSULE, LIQUID FILLED ORAL 2 TIMES DAILY PRN
Status: DISCONTINUED | OUTPATIENT
Start: 2023-12-07 | End: 2023-12-08 | Stop reason: HOSPADM

## 2023-12-07 RX ORDER — KETOROLAC TROMETHAMINE 30 MG/ML
INJECTION, SOLUTION INTRAMUSCULAR; INTRAVENOUS AS NEEDED
Status: DISCONTINUED | OUTPATIENT
Start: 2023-12-07 | End: 2023-12-07 | Stop reason: SURG

## 2023-12-07 RX ORDER — SODIUM CHLORIDE 0.9 % (FLUSH) 0.9 %
10 SYRINGE (ML) INJECTION AS NEEDED
Status: DISCONTINUED | OUTPATIENT
Start: 2023-12-07 | End: 2023-12-07 | Stop reason: HOSPADM

## 2023-12-07 RX ORDER — MIDAZOLAM HYDROCHLORIDE 2 MG/2ML
2 INJECTION, SOLUTION INTRAMUSCULAR; INTRAVENOUS ONCE
Status: COMPLETED | OUTPATIENT
Start: 2023-12-07 | End: 2023-12-07

## 2023-12-07 RX ORDER — SODIUM CHLORIDE 9 MG/ML
40 INJECTION, SOLUTION INTRAVENOUS AS NEEDED
Status: DISCONTINUED | OUTPATIENT
Start: 2023-12-07 | End: 2023-12-07 | Stop reason: HOSPADM

## 2023-12-07 RX ADMIN — CETIRIZINE HYDROCHLORIDE 10 MG: 10 TABLET, FILM COATED ORAL at 15:40

## 2023-12-07 RX ADMIN — SODIUM CHLORIDE 60 ML/HR: 9 INJECTION, SOLUTION INTRAVENOUS at 15:40

## 2023-12-07 RX ADMIN — MIDAZOLAM HYDROCHLORIDE 2 MG: 1 INJECTION, SOLUTION INTRAMUSCULAR; INTRAVENOUS at 09:02

## 2023-12-07 RX ADMIN — EPHEDRINE SULFATE 20 MG: 50 INJECTION INTRAVENOUS at 10:39

## 2023-12-07 RX ADMIN — DEXAMETHASONE SODIUM PHOSPHATE 4 MG: 4 INJECTION, SOLUTION INTRAMUSCULAR; INTRAVENOUS at 10:08

## 2023-12-07 RX ADMIN — KETOROLAC TROMETHAMINE 30 MG: 30 INJECTION, SOLUTION INTRAMUSCULAR; INTRAVENOUS at 10:08

## 2023-12-07 RX ADMIN — EPHEDRINE SULFATE 10 MG: 50 INJECTION INTRAVENOUS at 10:09

## 2023-12-07 RX ADMIN — ONDANSETRON 4 MG: 2 INJECTION INTRAMUSCULAR; INTRAVENOUS at 10:08

## 2023-12-07 RX ADMIN — FENTANYL CITRATE 100 MCG: 50 INJECTION, SOLUTION INTRAMUSCULAR; INTRAVENOUS at 09:59

## 2023-12-07 RX ADMIN — Medication 10 ML: at 22:24

## 2023-12-07 RX ADMIN — ROCURONIUM BROMIDE 50 MG: 50 INJECTION INTRAVENOUS at 09:59

## 2023-12-07 RX ADMIN — LIDOCAINE HYDROCHLORIDE 80 MG: 20 INJECTION, SOLUTION EPIDURAL; INFILTRATION; INTRACAUDAL; PERINEURAL at 09:59

## 2023-12-07 RX ADMIN — LEVOFLOXACIN 500 MG: 5 INJECTION, SOLUTION INTRAVENOUS at 09:52

## 2023-12-07 RX ADMIN — ACETAMINOPHEN 1000 MG: 500 TABLET ORAL at 08:36

## 2023-12-07 RX ADMIN — SODIUM CHLORIDE, POTASSIUM CHLORIDE, SODIUM LACTATE AND CALCIUM CHLORIDE 9 ML/HR: 600; 310; 30; 20 INJECTION, SOLUTION INTRAVENOUS at 08:36

## 2023-12-07 RX ADMIN — Medication 10 ML: at 15:40

## 2023-12-07 RX ADMIN — SUGAMMADEX 200 MG: 100 INJECTION, SOLUTION INTRAVENOUS at 10:50

## 2023-12-07 RX ADMIN — MONTELUKAST 10 MG: 10 TABLET, FILM COATED ORAL at 22:23

## 2023-12-07 RX ADMIN — PROPOFOL 150 MG: 10 INJECTION, EMULSION INTRAVENOUS at 09:59

## 2023-12-07 NOTE — OP NOTE
TRANSURETHRAL PROSTATE AQUABLATION  Procedure Report    Patient Name:  Dipak Petty  YOB: 1955    Date of Surgery:  12/7/2023      Pre-op Diagnosis:   BPH with obstruction/lower urinary tract symptoms [N40.1, N13.8]       Postop diagnosis:    Same    Procedure/CPT® Codes:      Procedure(s):    Aquablation    Staff:  Surgeon(s):  Jerry Tristan MD    Assistant: Loretta Wang CSA    Anesthesia: General    Estimated Blood Loss: 7 mL    Implants:    Nothing was implanted during the procedure    Specimen:          Specimens       ID Source Type Tests Collected By Collected At Frozen?    A Prostate Tissue TISSUE PATHOLOGY EXAM   Jerry Tristan MD 12/7/23 1028 No    Description: Prostate Chips    This specimen was not marked as sent.        Complications: none        Findings    50 g prostate  Total past time 7 minutes 2 seconds        Brief description of procedure    After appropriate identification patient was brought to the operating room.  General anesthesia was achieved without complication.  Patient was placed in doral lithotomy position and prepped and draped in normal sterile fashion.  A multidisciplinary timeout was undertaken documenting the correct patient site and procedure.     The TRUS stepper was mounted to the articulating arm and secured to the OR bed.  The ultrasound probe was attached to the stepper.  The ultrasound probe was aligned and confirmation made that the prostate is centered in line using both transverse and sagittal views.  The bladder neck, verumontanum and central/transition zones were identified.    Prostate was measured in 3 dimensions.  Prostate anatomy was noted.    The 24 Swazi aqua beam handpiece was inserted into the prostatic urethra and a complete cystoscopic evaluation was performed by inspecting the prostate bladder and identified in the location of the verumontanum/external sphincter.  The aqua beam handpiece was secured to the hand piece  articulating arm.  Confirmed alignment of the Aquablation handpiece and TRUS probe to be parallel and collinear.  Confirmation that the aqua beam nozzle is centered and anterior of the bladder neck or the median lobe.  The cystoscope was then retracted to visualize the verumontanum and the external sphincter and the cystoscope tip was positioned just proximal to the external sphincter.  Reconfirmed alignment of the TRUS probe with the aqua beam handpiece and compression applied with a TRUS probe.  Horizontal alignment of the handpiece wired and nausea was performed.    The Aquablation treatment zones were planned utilizing real-time TRUS to visualize the contour of the prostate and the depth and radial angles of resection were defined in the transverse view.  In the sagittal view, the aqua beam nozzle is identified in position registered with software.  The treatment contours were then adjusted to conform to the intended resection margins.  The median lobe, bladder neck and verumontanum were marked and confirmed in the treatment contour.    The Aquablation treatment was then started following the resection contour confirmed under the ultrasound guidance.    Total Aquablation resection time:      Once the Aquablation resection was complete a resectoscope was placed in with a large bipolar loop and clot evacuation was performed with Ellick evacuator.  All clots removed.  All tissue removed.    Cautery was used for pinpoint hemostasis until the urine was light pink.  22 Azerbaijani three-way Emmanuel catheter placed into the bladder without issue with 30 cc of sterile water placed in the balloon.  This was placed to continuous bladder irrigation and straight drainage.  Ultrasound probe was removed.  Patient tolerated procedure well and was brought to the recovery area without any issue.    Assistant: Loretta Wang CSA  was responsible for performing the following activities: Held/Positioned Camera and their skilled  assistance was necessary for the success of this case.    Jerry Tristan MD     Date: 12/7/2023  Time: 11:03 EST

## 2023-12-07 NOTE — LETTER
December 8, 2023     Patient: Dipak Petty   YOB: 1955   Date of Visit: 10/13/2023       To Whom It May Concern:    It is my medical opinion that Dipak Petty should remain out of work until January 1st, 2024.           Sincerely,        MD Marcela Pierce RN

## 2023-12-07 NOTE — ANESTHESIA PREPROCEDURE EVALUATION
" Anesthesia Evaluation     Patient summary reviewed and Nursing notes reviewed   no history of anesthetic complications:   NPO Solid Status: > 8 hours  NPO Liquid Status: > 2 hours           Airway   Mallampati: I  TM distance: >3 FB  Neck ROM: full  No difficulty expected  Dental    (+) edentulous    Pulmonary - normal exam    breath sounds clear to auscultation  (+) a smoker Current,  Cardiovascular - normal exam  Exercise tolerance: good (4-7 METS)    Rhythm: regular  Rate: normal    (+) hypertension      Neuro/Psych- negative ROS  GI/Hepatic/Renal/Endo - negative ROS     Musculoskeletal     Abdominal    Substance History      OB/GYN          Other        ROS/Med Hx Other: >4METS, HX HTN, CARDS OV 5/10/23 HX HTN, \"DOING WELL\", F/U 1 YR. KT                Anesthesia Plan    ASA 2     general     (Patient understands anesthesia not responsible for dental damage.)  intravenous induction     Anesthetic plan, risks, benefits, and alternatives have been provided, discussed and informed consent has been obtained with: patient.    Plan discussed with CRNA.    CODE STATUS:         "

## 2023-12-07 NOTE — ANESTHESIA POSTPROCEDURE EVALUATION
Patient: Dipak Petty    Procedure Summary       Date: 12/07/23 Room / Location: [facility] OR 02 / [facility] MAIN OR    Anesthesia Start: 0951 Anesthesia Stop: 1101    Procedure: Aquablation Diagnosis:       BPH with obstruction/lower urinary tract symptoms      (BPH with obstruction/lower urinary tract symptoms [N40.1, N13.8])    Surgeons: Jerry Tristan MD Provider:     Anesthesia Type: general ASA Status: 2            Anesthesia Type: general    Vitals  Vitals Value Taken Time   /67 12/07/23 1150   Temp 36.2 °C (97.1 °F) 12/07/23 1145   Pulse 55 12/07/23 1151   Resp 16 12/07/23 1145   SpO2 95 % 12/07/23 1151   Vitals shown include unfiled device data.        Post Anesthesia Care and Evaluation    Patient location during evaluation: bedside  Patient participation: complete - patient participated  Level of consciousness: awake  Pain management: adequate    Airway patency: patent  PONV Status: none  Cardiovascular status: acceptable  Respiratory status: acceptable  Hydration status: acceptable    Comments: An Anesthesiologist personally participated in the most demanding procedures (including induction and emergence if applicable) in the anesthesia plan, monitored the course of anesthesia administration at frequent intervals and remained physically present and available for immediate diagnosis and treatment of emergencies.

## 2023-12-07 NOTE — H&P
[facility]   UROLOGY HISTORY AND PHYSICAL    Patient Name: Dipak Petty  : 1955  MRN: [id number]  Primary Care Physician:  Bhupendra Moses MD  Date of admission: 2023    Subjective   Subjective     Chief Complaint:       BPH      HPI:    Dipak Petty is a 68 y.o. male BPH    No change in H&P    Review of Systems     10 systems reviewed and are negative other than what is listed in HPI    Personal History     Past Medical History:   Diagnosis Date    BPH (benign prostatic hyperplasia)     Hypertension     FOLLOWS DR JUAREZ. DENIED CP-SOB       Past Surgical History:   Procedure Laterality Date    BACK SURGERY      JOINT REPLACEMENT Left     left tkr    KNEE ARTHROSCOPY Left     x3       Family History: family history is not on file. Otherwise pertinent FHx was reviewed and not pertinent to current issue.    Social History:  reports that he has been smoking pipe. He has never used smokeless tobacco. He reports that he does not currently use alcohol. He reports that he does not use drugs.    Home Medications:  B Complex-Biotin-FA, EPINEPHrine, aspirin, cetirizine, finasteride, hydroCHLOROthiazide, lisinopril, meloxicam, montelukast, tadalafil, and tamsulosin      Allergies:  Allergies   Allergen Reactions    Nitrofuran Derivatives Angioedema       Objective   Objective     Vitals:   Temp:  [97.4 °F (36.3 °C)] 97.4 °F (36.3 °C)  Heart Rate:  [63] 63  Resp:  [18] 18  BP: (131)/(69) 131/69  Physical Exam    Constitutional: Awake, alert    Respiratory: Clear to auscultation bilaterally, nonlabored respirations    Cardiovascular: RRR, no murmurs, rubs, or gallops, palpable pedal pulses bilaterally   Gastrointestinal: Positive bowel sounds, soft, nontender, nondistended   Musculoskeletal: No bilateral ankle edema, no clubbing or cyanosis to extremities     Result Review    Result Review:  I have personally reviewed the results from the time of this admission to 2023 08:05 EST and agree with  these findings:  []  Laboratory  []  Microbiology  []  Radiology  []  EKG/Telemetry   []  Cardiology/Vascular   []  Pathology  []  Old records  []  Other:    Assessment & Plan   Assessment / Plan     Brief Patient Summary:  Dipak Petty is a 68 y.o. male     Active Hospital Problems:  Active Hospital Problems    Diagnosis     **BPH with obstruction/lower urinary tract symptoms        Plan:       Aquablation.   Risks and benefits were discussed including bleeding, infection and damage to the urinary system.  We also discussed the risk of anesthesia up to and including death.  Patient voiced understanding and would like to proceed.    Electronically signed by Jerry Tristan MD, 12/07/23, 8:05 AM EST.

## 2023-12-08 VITALS
DIASTOLIC BLOOD PRESSURE: 69 MMHG | HEIGHT: 73 IN | BODY MASS INDEX: 27.32 KG/M2 | WEIGHT: 206.13 LBS | TEMPERATURE: 97.6 F | HEART RATE: 52 BPM | RESPIRATION RATE: 16 BRPM | SYSTOLIC BLOOD PRESSURE: 136 MMHG | OXYGEN SATURATION: 93 %

## 2023-12-08 PROCEDURE — 25810000003 SODIUM CHLORIDE 0.9 % SOLUTION: Performed by: UROLOGY

## 2023-12-08 RX ORDER — HYDROCODONE BITARTRATE AND ACETAMINOPHEN 5; 325 MG/1; MG/1
1 TABLET ORAL EVERY 6 HOURS PRN
Qty: 12 TABLET | Refills: 0 | Status: SHIPPED | OUTPATIENT
Start: 2023-12-08

## 2023-12-08 RX ADMIN — SODIUM CHLORIDE 60 ML/HR: 9 INJECTION, SOLUTION INTRAVENOUS at 04:01

## 2023-12-08 RX ADMIN — Medication 10 ML: at 08:44

## 2023-12-08 RX ADMIN — LISINOPRIL 60 MG: 20 TABLET ORAL at 08:44

## 2023-12-08 RX ADMIN — HYDROCHLOROTHIAZIDE 12.5 MG: 12.5 TABLET ORAL at 08:44

## 2023-12-08 RX ADMIN — CETIRIZINE HYDROCHLORIDE 10 MG: 10 TABLET, FILM COATED ORAL at 08:44

## 2023-12-08 NOTE — PLAN OF CARE
Goal Outcome Evaluation:                      Education provided to both pt and spouse regarding catheter care. Pt/spouse questions answered. Discharge information and catheter care information given to pt/spouse in writing and in person. AVS given to pt. Pt given necessary supplies in order to care for nieto.

## 2023-12-08 NOTE — DISCHARGE SUMMARY
[facility]         DISCHARGE SUMMARY    Patient Name: Dipak Petty  : 1955  MRN: [id number]    Date of Admission: 2023  Date of Discharge:  23   Primary Care Physician: Bhupendra Moses MD    Consults       No orders found for last 30 day(s).            Surgical Procedures Since Admission:  Procedure(s):  Aquablation  Surgeon:  Jerry Tristan MD  Status:  1 Day Post-Op  -------------------      Presenting Problem:   BPH with obstruction/lower urinary tract symptoms [N40.1, N13.8]    Active and Resolved Hospital Problems:  Active Hospital Problems    Diagnosis POA    **BPH with obstruction/lower urinary tract symptoms [N40.1, N13.8] Unknown      Resolved Hospital Problems   No resolved problems to display.         Hospital Course     Hospital Course:  Dipak Petty is a 68 y.o. male admitted for continuous bladder irrigation weaned off overnight discharged home with catheter without issue the following morning    Day of Discharge     Vital Signs:  Temp:  [96.9 °F (36.1 °C)-98.1 °F (36.7 °C)] 97.6 °F (36.4 °C)  Heart Rate:  [51-86] 52  Resp:  [16-18] 16  BP: (108-136)/(61-74) 136/69  Flow (L/min):  [2] 2  Output by Drain (mL) 23 0701 - 23 1900 23 1901 - 23 0626 Range Total   Continuous Bladder Irrigation Triple-lumen 22 Fr 1650  1650         Pertinent  and/or Most Recent Results     LAB RESULTS:                  Brief Urine Lab Results  (Last result in the past 365 days)        Color   Clarity   Blood   Leuk Est   Nitrite   Protein   CREAT   Urine HCG        10/13/23 0951 Yellow   Clear   Negative   Negative   Negative   Trace                 Microbiology Results (last 10 days)       ** No results found for the last 240 hours. **             Labs Pending at Discharge:  Pending Labs       Order Current Status    Tissue Pathology Exam Collected (23 1028)            Discharge Details        Discharge Medications        New Medications         Instructions Start Date   HYDROcodone-acetaminophen 5-325 MG per tablet  Commonly known as: NORCO   1 tablet, Oral, Every 6 Hours PRN             Continue These Medications        Instructions Start Date   B COMPLEX-BIOTIN-FA PO   1 tablet, Oral, Daily      cetirizine 10 MG tablet  Commonly known as: zyrTEC   1 tablet, Oral, Daily      EPINEPHrine 0.3 MG/0.3ML solution auto-injector injection  Commonly known as: EPIPEN   0.3 mg, Intramuscular, See Admin Instructions, for allergic reaction      hydroCHLOROthiazide 12.5 MG tablet  Commonly known as: HYDRODIURIL   12.5 mg, Oral, Daily      lisinopril 40 MG tablet  Commonly known as: PRINIVIL,ZESTRIL   40 mg, Oral, Daily      montelukast 10 MG tablet  Commonly known as: SINGULAIR   10 mg, Oral, Every Night at Bedtime      tadalafil 20 MG tablet  Commonly known as: Cialis   20 mg, Oral, As Needed             Stop These Medications      Aspirin Low Dose 81 MG chewable tablet  Generic drug: aspirin     tamsulosin 0.4 MG capsule 24 hr capsule  Commonly known as: FLOMAX              Allergies   Allergen Reactions    Nitrofuran Derivatives Angioedema       Condition:    Good, stable    Physical exam:    Constitutional: Well-appearing, well-developed,  in no acute distress    Respiratory: Unlabored breathing    Abdominal: Nontender, nondistended, no rigidity or guarding, no hepatosplenomegaly    Genitourinary:     Bladder nonpalpable     Neurologic: Grossly oriented to person, place and time, judgment and insight intact, normal mood and affect          Discharge Disposition:  Home or Self Care    Diet:  Hospital:  Diet Order   Procedures    Diet: Regular/House Diet; Texture: Regular Texture (IDDSI 7); Fluid Consistency: Thin (IDDSI 0)       Discharge Activity:       No future appointments.    Follow-up in clinic in 3 days for catheter removal

## 2023-12-08 NOTE — PROGRESS NOTES
Chief Complaint: Urologic complaint    Subjective         History of Present Illness  Dipak Petty is a 68 y.o. male      ED  BPH  Gross hematuria      Minimal pain, minimal gross hematuria.  No clots    No fevers tolerating p.o.    Catheter in place draining well      12/7/2023   Aquablation        PVR    10/23     000  2/23       123  12/22     172        Previous      3 UTIs this year.    9/23 UTI-treated through [facility]-do not have records        2/23 cystoscopy - 50 g prostate,  4 cm prostate, moderate trabeculation.  Large cone-shaped dome with heavy trabeculations in this.  No pathology    on Flomax 0.4 and finasteride 5 mg daily.    Voiding okay as far as he can tell, no straining.  Nocturia x2-3    Patient is worried about erections      10/23 IPSS 20, QOL 4      Patient has used sildenafil in the past, they did help.  No side effects.  He was using 100 mg -not working as well as it used to     gross hematuria with UTI last year.    Patient's been on finasteride since 10/22      12/22 MRI abdomen with and without -simple cyst right kidney accounting for abnormal area on CT.  No suspicious masses.  12/7/2022 CT abdomen/pelvis with and without - delayed phase long term down - 2 x 2.3 cm right kidney decreased density likely complicated cyst or small mass.    Father had bladder cancer,   no history of urologic surgery.    No cardiopulmonary history.  Smokes a pipe.  ASA 81        PSA    12/22   2.8  12/20   2.0        Objective     Past Medical History:   Diagnosis Date    BPH (benign prostatic hyperplasia)     Hypertension     FOLLOWS DR JUAREZ. DENIED CP-SOB    Lupus        Past Surgical History:   Procedure Laterality Date    BACK SURGERY      JOINT REPLACEMENT Left     left tkr    KNEE ARTHROSCOPY Left     x3    PROSTATE AQUABLATION N/A 12/7/2023    Procedure: Aquablation;  Surgeon: Jerry Tristan MD;  Location: [facility] MAIN OR;  Service: [employer] - Urology;  Laterality: N/A;       No current  facility-administered medications for this visit.    Current Outpatient Medications:     HYDROcodone-acetaminophen (NORCO) 5-325 MG per tablet, Take 1 tablet by mouth Every 6 (Six) Hours As Needed for Moderate Pain., Disp: 12 tablet, Rfl: 0    Facility-Administered Medications Ordered in Other Visits:     acetaminophen (TYLENOL) tablet 650 mg, 650 mg, Oral, Q4H PRN **OR** acetaminophen (TYLENOL) suppository 650 mg, 650 mg, Rectal, Q4H PRN, Jerry Tristan MD    cetirizine (zyrTEC) tablet 10 mg, 10 mg, Oral, Daily, Jerry Tristan MD, 10 mg at 12/08/23 0844    docusate sodium (COLACE) capsule 100 mg, 100 mg, Oral, BID PRN, Jerry Tristan MD    EPINEPHrine (EPIPEN) injection 0.3 mg, 0.3 mg, Intramuscular, Once PRN, Jerry Tristan MD    hydrALAZINE (APRESOLINE) injection 10 mg, 10 mg, Intravenous, Q6H PRN, Jerry Tristan MD    hydroCHLOROthiazide (HYDRODIURIL) tablet 12.5 mg, 12.5 mg, Oral, Daily, Jerry Tristan MD, 12.5 mg at 12/08/23 0844    lisinopril (PRINIVIL,ZESTRIL) tablet 60 mg, 60 mg, Oral, Daily, Jerry Tristan MD, 60 mg at 12/08/23 0844    montelukast (SINGULAIR) tablet 10 mg, 10 mg, Oral, Nightly, Jerry Tristan MD, 10 mg at 12/07/23 2223    ondansetron (ZOFRAN) tablet 4 mg, 4 mg, Oral, Q6H PRN **OR** ondansetron (ZOFRAN) injection 4 mg, 4 mg, Intravenous, Q6H PRN, Jerry Tristan MD    oxyCODONE (ROXICODONE) immediate release tablet 5 mg, 5 mg, Oral, Q4H PRN, Jerry Tristan MD    sodium chloride 0.9 % flush 10 mL, 10 mL, Intravenous, Q12H, Jerry Tristan MD, 10 mL at 12/08/23 0844    sodium chloride 0.9 % flush 10 mL, 10 mL, Intravenous, PRN, Jerry Tristan MD    sodium chloride 0.9 % infusion 40 mL, 40 mL, Intravenous, PRN, Jerry Tristan MD    sodium chloride 0.9 % infusion, 60 mL/hr, Intravenous, Continuous, Jerry Tristan MD, Last Rate: 60 mL/hr at 12/08/23 0401, 60 mL/hr at 12/08/23 0401    Allergies   Allergen Reactions    Nitrofuran Derivatives  Angioedema        No family history on file.    Social History     Socioeconomic History    Marital status:    Tobacco Use    Smoking status: Some Days     Types: Pipe    Smokeless tobacco: Never    Tobacco comments:     INSTRUCTED NO SMOKING 24 HR PRIOR TO SURGERY PER ANESTHESIA PROTOCOL LAST 12/6/23   Vaping Use    Vaping Use: Never used   Substance and Sexual Activity    Alcohol use: Not Currently    Drug use: Never    Sexual activity: Defer       Vital Signs:   There were no vitals taken for this visit.                Assessment and Plan    Diagnoses and all orders for this visit:    1. Benign prostatic hyperplasia with lower urinary tract symptoms, symptom details unspecified (Primary)            ED    Has not yet used  Cont tadalafil 20 mg 1 tab as needed           BPH        Void trial today.  Patient understands if he  cannot void needs come to clinic or emergency room.      Follow-up in 4 weeks      PVR at follow-up

## 2023-12-08 NOTE — NURSING NOTE
Pt ambulated and nieto is draining light pink fluid. No clots or issues noted after ambulating. Nieto still draining. Pt has met criteria for discharge per Dr. Tristan instructions.

## 2023-12-11 ENCOUNTER — OFFICE VISIT (OUTPATIENT)
Dept: UROLOGY | Facility: CLINIC | Age: 68
End: 2023-12-11
Payer: COMMERCIAL

## 2023-12-11 DIAGNOSIS — N40.1 BENIGN PROSTATIC HYPERPLASIA WITH LOWER URINARY TRACT SYMPTOMS, SYMPTOM DETAILS UNSPECIFIED: Primary | ICD-10-CM

## 2023-12-11 LAB
CYTO UR: NORMAL
LAB AP CASE REPORT: NORMAL
LAB AP CLINICAL INFORMATION: NORMAL
PATH REPORT.FINAL DX SPEC: NORMAL
PATH REPORT.GROSS SPEC: NORMAL

## 2023-12-11 PROCEDURE — 99213 OFFICE O/P EST LOW 20 MIN: CPT | Performed by: UROLOGY

## 2023-12-13 LAB — C1Q SERPL-MCNC: 20.5 MG/DL (ref 10.2–20.3)

## 2023-12-18 DIAGNOSIS — N40.1 BENIGN PROSTATIC HYPERPLASIA WITH LOWER URINARY TRACT SYMPTOMS, SYMPTOM DETAILS UNSPECIFIED: ICD-10-CM

## 2023-12-19 RX ORDER — FINASTERIDE 5 MG/1
TABLET, FILM COATED ORAL
Qty: 30 TABLET | Refills: 2 | Status: SHIPPED | OUTPATIENT
Start: 2023-12-19

## 2024-01-09 NOTE — PROGRESS NOTES
Chief Complaint: Urologic complaint    Subjective         History of Present Illness  Dipak Petty is a 68 y.o. male      ED  BPH  Gross hematuria      12/7/2023 Aquablation  Path  -BPH      No pain.  Patient is having some urgency and urge incontinence.  Not currently wearing pads    No gross hematuria, no dysuria or burning    Off finasteride and Flomax      Patient does have tadalafil 20 mg as needed sexual intercourse/erections.  Has not used yet      No cardiopulmonary history.  Smokes a pipe.  ASA 83      PVR    1/24      000   10/23     000  2/23       123  12/22     172          Previous      3 UTIs this year.    9/23 UTI-[facility]-do not have records    2/23 cystoscopy - 50 g prostate,  4 cm prostate, moderate trabeculation.  Large cone-shaped dome with heavy trabeculations in this.  No pathology    on Flomax 0.4 and finasteride 5 mg daily.    Voiding okay as far as he can tell, no straining.  Nocturia x2-3    Patient is worried about erections      10/23 IPSS 20, QOL 4      Patient has used sildenafil in the past, they did help.  No side effects.  He was using 100 mg -not working as well as it used to        12/22 MRI abdomen with and without -simple cyst right kidney accounting for abnormal area on CT.  No suspicious masses.  12/7/2022 CT abdomen/pelvis with and without - delayed phase group home down - 2 x 2.3 cm right kidney decreased density likely complicated cyst or small mass.    Father had bladder cancer,   no history of urologic surgery.        PSA    12/22   2.8  12/20   2.0    Bladder Scan interpretation 01/12/2024    Estimation of residual urine via BVI 3000 Verathon Bladder Scan  MA/nurse performing: MAURICIO Madison  Residual Urine: 000 ml  Indication: Benign prostatic hyperplasia with lower urinary tract symptoms, symptom details unspecified    Erectile dysfunction due to arterial insufficiency   Position: Supine  Examination: Incremental scanning of the suprapubic area using 2.0  MHz transducer using copious amounts of acoustic gel.   Findings: An anechoic area was demonstrated which represented the bladder, with measurement of residual urine as noted. I inspected this myself. In that the residual urine was stable or insignificant, refer to plan for treatment and plan necessary at this time.          Objective     Past Medical History:   Diagnosis Date    BPH (benign prostatic hyperplasia)     Hypertension     FOLLOWS DR JUAREZ. DENIED CP-SOB    Lupus        Past Surgical History:   Procedure Laterality Date    BACK SURGERY      JOINT REPLACEMENT Left     left tkr    KNEE ARTHROSCOPY Left     x3    PROSTATE AQUABLATION N/A 12/7/2023    Procedure: Aquablation;  Surgeon: Jerry Tristan MD;  Location: [facility] MAIN OR;  Service: [employer] - Urology;  Laterality: N/A;         Current Outpatient Medications:     B COMPLEX-BIOTIN-FA PO, Take 1 tablet by mouth Daily., Disp: , Rfl:     cetirizine (zyrTEC) 10 MG tablet, Take 1 tablet by mouth Daily., Disp: , Rfl:     EPINEPHrine (EPIPEN) 0.3 MG/0.3ML solution auto-injector injection, Inject 0.3 mL into the appropriate muscle as directed by prescriber See Admin Instructions. for allergic reaction, Disp: , Rfl:     finasteride (PROSCAR) 5 MG tablet, TAKE 1 TABLET BY MOUTH EVERY DAY, Disp: 30 tablet, Rfl: 2    hydroCHLOROthiazide (HYDRODIURIL) 12.5 MG tablet, Take 1 tablet by mouth Daily., Disp: , Rfl:     HYDROcodone-acetaminophen (NORCO) 5-325 MG per tablet, Take 1 tablet by mouth Every 6 (Six) Hours As Needed for Moderate Pain., Disp: 12 tablet, Rfl: 0    lisinopril (PRINIVIL,ZESTRIL) 40 MG tablet, Take 1 tablet by mouth Daily., Disp: , Rfl:     montelukast (SINGULAIR) 10 MG tablet, Take 1 tablet by mouth every night at bedtime., Disp: , Rfl:     tadalafil (Cialis) 20 MG tablet, Take 1 tablet by mouth As Needed for Erectile Dysfunction., Disp: 5 tablet, Rfl: 5    Allergies   Allergen Reactions    Nitrofuran Derivatives Angioedema        No  family history on file.    Social History     Socioeconomic History    Marital status:    Tobacco Use    Smoking status: Some Days     Types: Pipe    Smokeless tobacco: Never    Tobacco comments:     INSTRUCTED NO SMOKING 24 HR PRIOR TO SURGERY PER ANESTHESIA PROTOCOL LAST 12/6/23   Vaping Use    Vaping Use: Never used   Substance and Sexual Activity    Alcohol use: Not Currently    Drug use: Never    Sexual activity: Defer       Vital Signs:   There were no vitals taken for this visit.                Assessment and Plan    Diagnoses and all orders for this visit:    1. Benign prostatic hyperplasia with lower urinary tract symptoms, symptom details unspecified (Primary)    2. Erectile dysfunction due to arterial insufficiency            ED    Has tadalafil 20 mg 1 tab as needed.  Has not yet used.          BPH        Voiding well.  Off prostate meds        Follow-up with PCP

## 2024-01-12 ENCOUNTER — OFFICE VISIT (OUTPATIENT)
Dept: UROLOGY | Facility: CLINIC | Age: 69
End: 2024-01-12
Payer: COMMERCIAL

## 2024-01-12 VITALS — BODY MASS INDEX: 26.9 KG/M2 | WEIGHT: 203 LBS | RESPIRATION RATE: 16 BRPM | HEIGHT: 73 IN

## 2024-01-12 DIAGNOSIS — N52.01 ERECTILE DYSFUNCTION DUE TO ARTERIAL INSUFFICIENCY: ICD-10-CM

## 2024-01-12 DIAGNOSIS — N40.1 BENIGN PROSTATIC HYPERPLASIA WITH LOWER URINARY TRACT SYMPTOMS, SYMPTOM DETAILS UNSPECIFIED: Primary | ICD-10-CM

## 2024-01-12 LAB
BILIRUB BLD-MCNC: NEGATIVE MG/DL
CLARITY, POC: CLEAR
COLOR UR: YELLOW
EXPIRATION DATE: ABNORMAL
GLUCOSE UR STRIP-MCNC: NEGATIVE MG/DL
KETONES UR QL: NEGATIVE
LEUKOCYTE EST, POC: ABNORMAL
Lab: ABNORMAL
NITRITE UR-MCNC: NEGATIVE MG/ML
PH UR: 6.5 [PH] (ref 5–8)
PROT UR STRIP-MCNC: ABNORMAL MG/DL
RBC # UR STRIP: ABNORMAL /UL
SP GR UR: 1.02 (ref 1–1.03)
SPECIMEN VOL 24H UR: 0 L
UROBILINOGEN UR QL: ABNORMAL

## 2024-10-10 ENCOUNTER — TRANSCRIBE ORDERS (OUTPATIENT)
Dept: LAB | Facility: HOSPITAL | Age: 69
End: 2024-10-10
Payer: MEDICARE

## 2024-10-10 ENCOUNTER — LAB (OUTPATIENT)
Dept: LAB | Facility: HOSPITAL | Age: 69
End: 2024-10-10
Payer: MEDICARE

## 2024-10-10 DIAGNOSIS — T78.3XXA ANGIOEDEMA, INITIAL ENCOUNTER: ICD-10-CM

## 2024-10-10 DIAGNOSIS — T78.3XXA ANGIOEDEMA, INITIAL ENCOUNTER: Primary | ICD-10-CM

## 2024-10-10 PROCEDURE — 82785 ASSAY OF IGE: CPT

## 2024-10-10 PROCEDURE — 86008 ALLG SPEC IGE RECOMB EA: CPT

## 2024-10-10 PROCEDURE — 36415 COLL VENOUS BLD VENIPUNCTURE: CPT

## 2024-10-17 LAB
ALPHA-GAL IGE QN: 1.37 KU/L
IGE SERPL-ACNC: 269 IU/ML (ref 6–495)

## 2025-04-11 ENCOUNTER — HOSPITAL ENCOUNTER (OUTPATIENT)
Dept: GENERAL RADIOLOGY | Facility: HOSPITAL | Age: 70
Discharge: HOME | End: 2025-04-11
Payer: MEDICARE

## 2025-04-11 ENCOUNTER — TRANSCRIBE ORDERS (OUTPATIENT)
Dept: ADMINISTRATIVE | Facility: HOSPITAL | Age: 70
End: 2025-04-11
Payer: MEDICARE

## 2025-04-11 DIAGNOSIS — J30.1 ALLERGIC RHINITIS DUE TO POLLEN, UNSPECIFIED SEASONALITY: ICD-10-CM

## 2025-04-11 DIAGNOSIS — J30.89 OTHER ALLERGIC RHINITIS: ICD-10-CM

## 2025-04-11 DIAGNOSIS — R06.2 WHEEZING: Primary | ICD-10-CM

## 2025-04-11 DIAGNOSIS — H10.45 OTHER CHRONIC ALLERGIC CONJUNCTIVITIS, UNSPECIFIED LATERALITY: ICD-10-CM

## 2025-04-11 DIAGNOSIS — Z91.018 ALLERGY TO OTHER FOODS: ICD-10-CM

## 2025-04-11 DIAGNOSIS — R06.2 WHEEZING: ICD-10-CM

## 2025-04-11 DIAGNOSIS — T78.3XXA ANGIONEUROTIC EDEMA, INITIAL ENCOUNTER: ICD-10-CM

## 2025-04-11 PROCEDURE — 71046 X-RAY EXAM CHEST 2 VIEWS: CPT

## 2025-04-14 ENCOUNTER — TRANSCRIBE ORDERS (OUTPATIENT)
Dept: ADMINISTRATIVE | Facility: HOSPITAL | Age: 70
End: 2025-04-14
Payer: MEDICARE

## 2025-04-14 DIAGNOSIS — R91.1 NODULE OF UPPER LOBE OF RIGHT LUNG: Primary | ICD-10-CM

## 2025-04-18 ENCOUNTER — LAB (OUTPATIENT)
Dept: LAB | Facility: HOSPITAL | Age: 70
End: 2025-04-18
Payer: MEDICARE

## 2025-04-18 ENCOUNTER — TRANSCRIBE ORDERS (OUTPATIENT)
Dept: ADMINISTRATIVE | Facility: HOSPITAL | Age: 70
End: 2025-04-18
Payer: MEDICARE

## 2025-04-18 ENCOUNTER — HOSPITAL ENCOUNTER (OUTPATIENT)
Dept: CT IMAGING | Facility: HOSPITAL | Age: 70
Discharge: HOME | End: 2025-04-18
Payer: MEDICARE

## 2025-04-18 DIAGNOSIS — I10 HYPERTENSION, UNSPECIFIED TYPE: ICD-10-CM

## 2025-04-18 DIAGNOSIS — I10 HYPERTENSION, UNSPECIFIED TYPE: Primary | ICD-10-CM

## 2025-04-18 DIAGNOSIS — R91.1 NODULE OF UPPER LOBE OF RIGHT LUNG: ICD-10-CM

## 2025-04-18 LAB
ALBUMIN SERPL-MCNC: 4 G/DL (ref 3.5–5.2)
ALBUMIN/GLOB SERPL: 1.1 G/DL
ALP SERPL-CCNC: 105 U/L (ref 39–117)
ALT SERPL W P-5'-P-CCNC: 16 U/L (ref 1–41)
ANION GAP SERPL CALCULATED.3IONS-SCNC: 9.5 MMOL/L (ref 5–15)
AST SERPL-CCNC: 26 U/L (ref 1–40)
BASOPHILS # BLD AUTO: 0.04 10*3/MM3 (ref 0–0.2)
BASOPHILS NFR BLD AUTO: 0.6 % (ref 0–1.5)
BILIRUB SERPL-MCNC: 0.4 MG/DL (ref 0–1.2)
BUN SERPL-MCNC: 21 MG/DL (ref 8–23)
BUN/CREAT SERPL: 15.2 (ref 7–25)
CALCIUM SPEC-SCNC: 9.7 MG/DL (ref 8.6–10.5)
CHLORIDE SERPL-SCNC: 102 MMOL/L (ref 98–107)
CO2 SERPL-SCNC: 25.5 MMOL/L (ref 22–29)
CREAT SERPL-MCNC: 1.38 MG/DL (ref 0.76–1.27)
DEPRECATED RDW RBC AUTO: 44.9 FL (ref 37–54)
EGFRCR SERPLBLD CKD-EPI 2021: 56 ML/MIN/1.73
EOSINOPHIL # BLD AUTO: 0.09 10*3/MM3 (ref 0–0.4)
EOSINOPHIL NFR BLD AUTO: 1.4 % (ref 0.3–6.2)
ERYTHROCYTE [DISTWIDTH] IN BLOOD BY AUTOMATED COUNT: 13.2 % (ref 12.3–15.4)
GLOBULIN UR ELPH-MCNC: 3.6 GM/DL
GLUCOSE SERPL-MCNC: 73 MG/DL (ref 65–99)
HCT VFR BLD AUTO: 43.6 % (ref 37.5–51)
HGB BLD-MCNC: 14.9 G/DL (ref 13–17.7)
IMM GRANULOCYTES # BLD AUTO: 0.02 10*3/MM3 (ref 0–0.05)
IMM GRANULOCYTES NFR BLD AUTO: 0.3 % (ref 0–0.5)
LYMPHOCYTES # BLD AUTO: 1.34 10*3/MM3 (ref 0.7–3.1)
LYMPHOCYTES NFR BLD AUTO: 20.8 % (ref 19.6–45.3)
MCH RBC QN AUTO: 31.2 PG (ref 26.6–33)
MCHC RBC AUTO-ENTMCNC: 34.2 G/DL (ref 31.5–35.7)
MCV RBC AUTO: 91.4 FL (ref 79–97)
MONOCYTES # BLD AUTO: 0.48 10*3/MM3 (ref 0.1–0.9)
MONOCYTES NFR BLD AUTO: 7.4 % (ref 5–12)
NEUTROPHILS NFR BLD AUTO: 4.48 10*3/MM3 (ref 1.7–7)
NEUTROPHILS NFR BLD AUTO: 69.5 % (ref 42.7–76)
NRBC BLD AUTO-RTO: 0 /100 WBC (ref 0–0.2)
PLATELET # BLD AUTO: 325 10*3/MM3 (ref 140–450)
PMV BLD AUTO: 10.7 FL (ref 6–12)
POTASSIUM SERPL-SCNC: 4.4 MMOL/L (ref 3.5–5.2)
PROT SERPL-MCNC: 7.6 G/DL (ref 6–8.5)
RBC # BLD AUTO: 4.77 10*6/MM3 (ref 4.14–5.8)
SODIUM SERPL-SCNC: 137 MMOL/L (ref 136–145)
WBC NRBC COR # BLD AUTO: 6.45 10*3/MM3 (ref 3.4–10.8)

## 2025-04-18 PROCEDURE — 80053 COMPREHEN METABOLIC PANEL: CPT

## 2025-04-18 PROCEDURE — 36415 COLL VENOUS BLD VENIPUNCTURE: CPT

## 2025-04-18 PROCEDURE — 85025 COMPLETE CBC W/AUTO DIFF WBC: CPT

## 2025-04-18 PROCEDURE — 71250 CT THORAX DX C-: CPT

## 2025-04-22 ENCOUNTER — TRANSCRIBE ORDERS (OUTPATIENT)
Dept: FAMILY MEDICINE CLINIC | Facility: CLINIC | Age: 70
End: 2025-04-22
Payer: MEDICARE

## 2025-04-22 DIAGNOSIS — I15.9 SECONDARY HYPERTENSION: Primary | ICD-10-CM

## 2025-04-28 ENCOUNTER — TRANSCRIBE ORDERS (OUTPATIENT)
Dept: ADMINISTRATIVE | Facility: HOSPITAL | Age: 70
End: 2025-04-28
Payer: MEDICARE

## 2025-04-28 DIAGNOSIS — J98.4 LUNG DISORDER: ICD-10-CM

## 2025-04-28 DIAGNOSIS — R91.8 OTHER NONSPECIFIC ABNORMAL FINDING OF LUNG FIELD: ICD-10-CM

## 2025-04-28 DIAGNOSIS — J98.4 OTHER DISORDERS OF LUNG: Primary | ICD-10-CM

## 2025-05-02 ENCOUNTER — HOSPITAL ENCOUNTER (OUTPATIENT)
Dept: PET IMAGING | Facility: HOSPITAL | Age: 70
Discharge: HOME | End: 2025-05-02
Payer: MEDICARE

## 2025-05-02 DIAGNOSIS — J98.4 OTHER DISORDERS OF LUNG: ICD-10-CM

## 2025-05-02 DIAGNOSIS — R91.8 OTHER NONSPECIFIC ABNORMAL FINDING OF LUNG FIELD: ICD-10-CM

## 2025-05-02 PROCEDURE — 34310000005 FLUDEOXYGLUCOSE F18 SOLUTION: Performed by: FAMILY MEDICINE

## 2025-05-02 PROCEDURE — A9552 F18 FDG: HCPCS | Performed by: FAMILY MEDICINE

## 2025-05-02 PROCEDURE — 78815 PET IMAGE W/CT SKULL-THIGH: CPT

## 2025-05-02 RX ADMIN — FLUDEOXYGLUCOSE F 18 1 DOSE: 200 INJECTION, SOLUTION INTRAVENOUS at 07:58

## 2025-05-08 ENCOUNTER — OFFICE VISIT (OUTPATIENT)
Dept: PULMONOLOGY | Facility: CLINIC | Age: 70
End: 2025-05-08
Payer: MEDICARE

## 2025-05-08 VITALS
OXYGEN SATURATION: 97 % | TEMPERATURE: 98.4 F | WEIGHT: 211 LBS | BODY MASS INDEX: 27.96 KG/M2 | DIASTOLIC BLOOD PRESSURE: 96 MMHG | HEIGHT: 73 IN | SYSTOLIC BLOOD PRESSURE: 151 MMHG | RESPIRATION RATE: 16 BRPM | HEART RATE: 62 BPM

## 2025-05-08 DIAGNOSIS — R59.0 MEDIASTINAL ADENOPATHY: ICD-10-CM

## 2025-05-08 DIAGNOSIS — R91.1 LUNG NODULE: Primary | ICD-10-CM

## 2025-05-08 PROCEDURE — 99204 OFFICE O/P NEW MOD 45 MIN: CPT | Performed by: INTERNAL MEDICINE

## 2025-05-08 PROCEDURE — 1159F MED LIST DOCD IN RCRD: CPT | Performed by: INTERNAL MEDICINE

## 2025-05-08 PROCEDURE — 1160F RVW MEDS BY RX/DR IN RCRD: CPT | Performed by: INTERNAL MEDICINE

## 2025-05-08 RX ORDER — ASPIRIN 81 MG/1
81 TABLET ORAL NIGHTLY
Status: ON HOLD | COMMUNITY

## 2025-05-08 RX ORDER — DIPHENHYDRAMINE HCL 25 MG/1
1 TABLET ORAL EVERY 6 HOURS
COMMUNITY
Start: 2025-03-23 | End: 2025-05-09

## 2025-05-08 RX ORDER — FAMOTIDINE 20 MG/1
1 TABLET, FILM COATED ORAL DAILY
COMMUNITY
Start: 2025-03-23 | End: 2025-05-09

## 2025-05-08 RX ORDER — PREDNISONE 20 MG/1
3 TABLET ORAL DAILY
COMMUNITY
Start: 2025-03-23 | End: 2025-05-09

## 2025-05-08 RX ORDER — LOSARTAN POTASSIUM 50 MG/1
1 TABLET ORAL DAILY
COMMUNITY
Start: 2025-03-23 | End: 2025-05-09

## 2025-05-08 NOTE — H&P (VIEW-ONLY)
"Chief Complaint  Establish Care (Talk about ion ), Abnormal CT/PET, and Cough (In the morning/)    Subjective        Dipak Petty presents to [facility] PULMONARY & CRIT[facility]ARE MEDICINE  History of Present Illness  History of Present Illness  The patient is a 70-year-old male who presents for abnormal imaging.    He was referred to our facility following the identification of several unusual spots on his CT scan, initially ordered by his allergist due to wheezing. Subsequently, he was referred back to his primary care physician, Dr. Moses, who requested a CT scan. He has a history of smoking pipes for 50 years and cigarettes for approximately 15 years. He reports no previous chest CT scans. It is uncertain whether Dr. Dumont conducted a CT scan or an MRI prior to 2014, as the records are not readily available. He recalls that a previous physician had suggested histoplasmosis as a potential diagnosis. A chest x-ray was performed, which led to an MRI being ordered by Dr. Morel. He underwent biannual MRIs for about 1.5 years, all of which showed no changes.    He has an appointment with his cardiologist on 05/14/2025 and has held off on getting anything for blood pressure until he sees the cardiologist. This all started with angioedema that the emergency room doctor said was because of lisinopril. The medicine prescribed for blood pressure is in the same family and could have the same reactions.    SOCIAL HISTORY  The patient admits to smoking a pipe and cigarettes for about 15 years.    MEDICATIONS  Aspirin    Objective   Vital Signs:  /96 (BP Location: Left arm, Patient Position: Sitting, Cuff Size: Adult)   Pulse 62   Temp 98.4 °F (36.9 °C) (Temporal)   Resp 16   Ht 185.4 cm (73\")   Wt 95.7 kg (211 lb)   SpO2 97% Comment: room air  BMI 27.84 kg/m²   Estimated body mass index is 27.84 kg/m² as calculated from the following:    Height as of this encounter: 185.4 cm (73\").    Weight " as of this encounter: 95.7 kg (211 lb).          Physical Exam   Physical Exam  Lungs were auscultated.    Result Review :         Results  Imaging  CT scan shows areas of emphysema, some areas of scarring, and enlarged lymph nodes with calcium. PET scan shows a mass in the right upper lobe and multiple mediastinal lymph nodes that are positive.              Assessment and Plan   Diagnoses and all orders for this visit:    1. Lung nodule (Primary)  -     Case Request    2. Mediastinal adenopathy  -     Case Request      Assessment & Plan  1. Abnormal imaging.  The CT scan reveals significant emphysema, areas of scarring, and calcified lymph nodes, suggesting granulomatous disease of the lung. The PET scan shows hypermetabolic activity in the right upper lobe and multiple mediastinal lymph nodes, raising concerns for malignancy or fibrosing mediastinitis. A bronchoscopy with EBUS will be performed on 05/16/2025 to biopsy the lung tissue and lymph nodes to confirm the diagnosis. The procedure, including its risks and benefits, was thoroughly discussed. He was advised to discontinue aspirin intake for a few days prior to the procedure.    2. Emphysema.  The CT scan shows significant emphysema. He was advised to use inhalers and maintain regular exercise to manage his condition.    3. Blood pressure management.  He is currently holding off on blood pressure medication until he sees his cardiologist on 05/14/2025. The potential switch from lisinopril to losartan was discussed, noting that losartan is less likely to cause angioedema or cough.      Obtain consent for bronchoscopy with Ion robot and EBUS    Risks versus benefits of bronchoscopy explained to the patient including death, respiratory failure requiring intubation mechanical ventilation, pneumothorax requiring chest tube placement, bleeding.  Patient voices understanding of the risks of the procedure and wishes to proceed.       Follow Up   Return in about 2  weeks (around 5/22/2025).  Patient was given instructions and counseling regarding his condition or for health maintenance advice. Please see specific information pulled into the AVS if appropriate.         Patient or patient representative verbalized consent for the use of Ambient Listening during the visit with  Rafael Briggs DO for chart documentation. 5/8/2025  16:50 EDT

## 2025-05-08 NOTE — PROGRESS NOTES
"Chief Complaint  Establish Care (Talk about ion ), Abnormal CT/PET, and Cough (In the morning/)    Subjective        Dipak Petty presents to [facility] PULMONARY & CRITICAL CARE MEDICINE  History of Present Illness  History of Present Illness  The patient is a 70-year-old male who presents for abnormal imaging.    He was referred to our facility following the identification of several unusual spots on his CT scan, initially ordered by his allergist due to wheezing. Subsequently, he was referred back to his primary care physician, Dr. Moses, who requested a CT scan. He has a history of smoking pipes for 50 years and cigarettes for approximately 15 years. He reports no previous chest CT scans. It is uncertain whether Dr. Dumont conducted a CT scan or an MRI prior to 2014, as the records are not readily available. He recalls that a previous physician had suggested histoplasmosis as a potential diagnosis. A chest x-ray was performed, which led to an MRI being ordered by Dr. Morel. He underwent biannual MRIs for about 1.5 years, all of which showed no changes.    He has an appointment with his cardiologist on 05/14/2025 and has held off on getting anything for blood pressure until he sees the cardiologist. This all started with angioedema that the emergency room doctor said was because of lisinopril. The medicine prescribed for blood pressure is in the same family and could have the same reactions.    SOCIAL HISTORY  The patient admits to smoking a pipe and cigarettes for about 15 years.    MEDICATIONS  Aspirin    Objective   Vital Signs:  /96 (BP Location: Left arm, Patient Position: Sitting, Cuff Size: Adult)   Pulse 62   Temp 98.4 °F (36.9 °C) (Temporal)   Resp 16   Ht 185.4 cm (73\")   Wt 95.7 kg (211 lb)   SpO2 97% Comment: room air  BMI 27.84 kg/m²   Estimated body mass index is 27.84 kg/m² as calculated from the following:    Height as of this encounter: 185.4 cm (73\").    Weight " as of this encounter: 95.7 kg (211 lb).          Physical Exam   Physical Exam  Lungs were auscultated.    Result Review :         Results  Imaging  CT scan shows areas of emphysema, some areas of scarring, and enlarged lymph nodes with calcium. PET scan shows a mass in the right upper lobe and multiple mediastinal lymph nodes that are positive.              Assessment and Plan   Diagnoses and all orders for this visit:    1. Lung nodule (Primary)  -     Case Request    2. Mediastinal adenopathy  -     Case Request      Assessment & Plan  1. Abnormal imaging.  The CT scan reveals significant emphysema, areas of scarring, and calcified lymph nodes, suggesting granulomatous disease of the lung. The PET scan shows hypermetabolic activity in the right upper lobe and multiple mediastinal lymph nodes, raising concerns for malignancy or fibrosing mediastinitis. A bronchoscopy with EBUS will be performed on 05/16/2025 to biopsy the lung tissue and lymph nodes to confirm the diagnosis. The procedure, including its risks and benefits, was thoroughly discussed. He was advised to discontinue aspirin intake for a few days prior to the procedure.    2. Emphysema.  The CT scan shows significant emphysema. He was advised to use inhalers and maintain regular exercise to manage his condition.    3. Blood pressure management.  He is currently holding off on blood pressure medication until he sees his cardiologist on 05/14/2025. The potential switch from lisinopril to losartan was discussed, noting that losartan is less likely to cause angioedema or cough.      Obtain consent for bronchoscopy with Ion robot and EBUS    Risks versus benefits of bronchoscopy explained to the patient including death, respiratory failure requiring intubation mechanical ventilation, pneumothorax requiring chest tube placement, bleeding.  Patient voices understanding of the risks of the procedure and wishes to proceed.       Follow Up   Return in about 2  weeks (around 5/22/2025).  Patient was given instructions and counseling regarding his condition or for health maintenance advice. Please see specific information pulled into the AVS if appropriate.         Patient or patient representative verbalized consent for the use of Ambient Listening during the visit with  Rafael Briggs DO for chart documentation. 5/8/2025  16:50 EDT

## 2025-05-09 NOTE — PRE-PROCEDURE INSTRUCTIONS
PATIENT INSTRUCTED TO BE:    - NOTHING TO EAT AFTER MIDNIGHT OR CHEW, EXCEPT CAN HAVE CLEAR LIQUIDS 2 HOURS PRIOR TO SURGERY ARRIVAL TIME , NO MORE THAN 8 OZ. (NOTHING RED)     - TO HOLD ALL VITAMINS, SUPPLEMENTS, NSAIDS FOR ONE WEEK PRIOR TO THEIR SURGICAL PROCEDURE      - BATHING INSTRUCTIONS GIVEN    INSTRUCTED NO LOTIONS, JEWELRY, PIERCINGS,  NAIL POLISH, OR DEODORANT DAY OF SURGERY      -INSTRUCTED TO TAKE THE FOLLOWING MEDICATIONS THE DAY OF SURGERY WITH SIPS OF WATER:   none        - DO NOT BRING ANY MEDICATIONS WITH YOU TO THE HOSPITAL THE DAY OF SURGERY, EXCEPT IF USE INHALERS. BRING INHALERS DAY OF SURGERY       - BRING CPAP OR BIPAP TO THE HOSPITAL ONLY IF YOU ARE SPENDING THE NIGHT    - DO NOT SMOKE OR VAPE 24 HOURS PRIOR TO PROCEDURE PER ANESTHESIA REQUEST     -MAKE SURE YOU HAVE A RIDE HOME OR SOMEONE TO STAY WITH YOU THE DAY OF THE PROCEDURE AFTER YOU GO HOME     - FOLLOW ANY OTHER INSTRUCTIONS GIVEN TO YOU BY YOUR SURGEON'S OFFICE.     Main Entrance [facility], Take elevator to first floor, turn left and check in at registration   - YOU WILL RECEIVE A PHONE CALL THE DAY PRIOR TO SURGERY BETWEEN 1PM AND 4 PM WITH ARRIVAL TIME, IF YOUR SURGERY IS ON A MONDAY YOU WILL RECEIVE A CALL THE FRIDAY PRIOR TO SURGERY DATE    - BRING CASH OR CREDIT CARD FOR COPAYMENT OF MEDICATIONS AFTER SURGERY IF YOU USE THE HOSPITAL PHARMACY (MEDS TO BED)    - PREADMISSION TESTING NURSE RENARD PHILLIPS [phone] IF HAVE ANY QUESTIONS     -PATIENT PROVIDED THE NUMBER FOR PREOP SURGICAL DEPT IF HAD QUESTIONS AFTER HOURS PRIOR TO SURGERY ([phone] ).  INFORMED PT IF NO ANSWER, LEAVE A MESSAGE AND SOMEONE WILL RETURN THEIR CALL       PATIENT VERBALIZED UNDERSTANDING

## 2025-05-12 ENCOUNTER — APPOINTMENT (OUTPATIENT)
Dept: GENERAL RADIOLOGY | Facility: HOSPITAL | Age: 70
End: 2025-05-12
Payer: MEDICARE

## 2025-05-12 ENCOUNTER — ANESTHESIA EVENT (OUTPATIENT)
Dept: PERIOP | Facility: HOSPITAL | Age: 70
End: 2025-05-12
Payer: MEDICARE

## 2025-05-12 ENCOUNTER — HOSPITAL ENCOUNTER (OUTPATIENT)
Facility: HOSPITAL | Age: 70
Setting detail: HOSPITAL OUTPATIENT SURGERY
Discharge: HOME | End: 2025-05-12
Attending: INTERNAL MEDICINE | Admitting: INTERNAL MEDICINE
Payer: MEDICARE

## 2025-05-12 ENCOUNTER — ANESTHESIA (OUTPATIENT)
Dept: PERIOP | Facility: HOSPITAL | Age: 70
End: 2025-05-12
Payer: MEDICARE

## 2025-05-12 VITALS
DIASTOLIC BLOOD PRESSURE: 72 MMHG | HEART RATE: 58 BPM | BODY MASS INDEX: 27.35 KG/M2 | RESPIRATION RATE: 16 BRPM | SYSTOLIC BLOOD PRESSURE: 116 MMHG | TEMPERATURE: 98 F | WEIGHT: 206.35 LBS | HEIGHT: 73 IN | OXYGEN SATURATION: 94 %

## 2025-05-12 DIAGNOSIS — R59.0 MEDIASTINAL ADENOPATHY: ICD-10-CM

## 2025-05-12 DIAGNOSIS — R91.1 LUNG NODULE: ICD-10-CM

## 2025-05-12 LAB
ACB CMPLX DNA BAL NAA+NON-PRB-NCNCRNG: NOT DETECTED
ANION GAP SERPL CALCULATED.3IONS-SCNC: 4.7 MMOL/L (ref 5–15)
BLACTX-M ISLT/SPM QL: NORMAL
BLAIMP ISLT/SPM QL: NORMAL
BLAKPC ISLT/SPM QL: NORMAL
BLAOXA-48-LIKE ISLT/SPM QL: NORMAL
BLAVIM ISLT/SPM QL: NORMAL
BUN SERPL-MCNC: 19 MG/DL (ref 8–23)
BUN/CREAT SERPL: 15.3 (ref 7–25)
C PNEUM DNA NPH QL NAA+NON-PROBE: NOT DETECTED
CALCIUM SPEC-SCNC: 9.1 MG/DL (ref 8.6–10.5)
CHLORIDE SERPL-SCNC: 104 MMOL/L (ref 98–107)
CILIATED BAL QL: 3 %
CO2 SERPL-SCNC: 27.3 MMOL/L (ref 22–29)
CREAT SERPL-MCNC: 1.24 MG/DL (ref 0.76–1.27)
E CLOAC COMP DNA BAL NAA+NON-PRB-NCNCRNG: NOT DETECTED
E COLI DNA BAL NAA+NON-PRB-NCNCRNG: NOT DETECTED
EGFRCR SERPLBLD CKD-EPI 2021: 63.3 ML/MIN/1.73
EOSINOPHIL NFR FLD MANUAL: 1 %
FLUAV SUBTYP SPEC NAA+PROBE: NOT DETECTED
FLUBV RNA ISLT QL NAA+PROBE: NOT DETECTED
GLUCOSE SERPL-MCNC: 116 MG/DL (ref 65–99)
GP B STREP DNA BAL NAA+NON-PRB-NCNCRNG: NOT DETECTED
GRAM STN SPEC: NORMAL
HADV DNA SPEC NAA+PROBE: NOT DETECTED
HAEM INFLU DNA BAL NAA+NON-PRB-NCNCRNG: NOT DETECTED
HCOV RNA LOWER RESP QL NAA+NON-PROBE: NOT DETECTED
HMPV RNA NPH QL NAA+NON-PROBE: NOT DETECTED
HPIV RNA LOWER RESP QL NAA+NON-PROBE: NOT DETECTED
K AEROGENES DNA BAL NAA+NON-PRB-NCNCRNG: NOT DETECTED
K OXYTOCA DNA BAL NAA+NON-PRB-NCNCRNG: NOT DETECTED
K PNEU GRP DNA BAL NAA+NON-PRB-NCNCRNG: NOT DETECTED
L PNEUMO DNA LOWER RESP QL NAA+NON-PROBE: NOT DETECTED
LYMPHOCYTES NFR FLD MANUAL: 7 %
M CATARRHALIS DNA BAL NAA+NON-PRB-NCNCRNG: NOT DETECTED
M PNEUMO IGG SER IA-ACNC: NOT DETECTED
MECA+MECC ISLT/SPM QL: NORMAL
NDM GENE: NORMAL
NEUTROPHILS NFR FLD MANUAL: 89 %
NIGHT BLUE STAIN TISS: NORMAL
P AERUGINOSA DNA BAL NAA+NON-PRB-NCNCRNG: NOT DETECTED
POTASSIUM SERPL-SCNC: 4.7 MMOL/L (ref 3.5–5.2)
PROTEUS SP DNA BAL NAA+NON-PRB-NCNCRNG: NOT DETECTED
RHINOVIRUS RNA SPEC NAA+PROBE: NOT DETECTED
RSV RNA NPH QL NAA+NON-PROBE: NOT DETECTED
S AUREUS DNA BAL NAA+NON-PRB-NCNCRNG: NOT DETECTED
S MARCESCENS DNA BAL NAA+NON-PRB-NCNCRNG: NOT DETECTED
S PNEUM DNA BAL NAA+NON-PRB-NCNCRNG: NOT DETECTED
S PYO DNA BAL NAA+NON-PRB-NCNCRNG: NOT DETECTED
SODIUM SERPL-SCNC: 136 MMOL/L (ref 136–145)
VISUAL PRESENCE OF BLOOD: PRESENT

## 2025-05-12 PROCEDURE — 25010000002 PHENYLEPHRINE HCL-NACL 1000-0.9 MCG/10ML-% SOLUTION PREFILLED SYRINGE

## 2025-05-12 PROCEDURE — 88104 CYTOPATH FL NONGYN SMEARS: CPT | Performed by: INTERNAL MEDICINE

## 2025-05-12 PROCEDURE — 88173 CYTOPATH EVAL FNA REPORT: CPT | Performed by: INTERNAL MEDICINE

## 2025-05-12 PROCEDURE — 87206 SMEAR FLUORESCENT/ACID STAI: CPT | Performed by: INTERNAL MEDICINE

## 2025-05-12 PROCEDURE — 88312 SPECIAL STAINS GROUP 1: CPT | Performed by: INTERNAL MEDICINE

## 2025-05-12 PROCEDURE — 25010000002 SUGAMMADEX 200 MG/2ML SOLUTION

## 2025-05-12 PROCEDURE — 25810000003 LACTATED RINGERS PER 1000 ML: Performed by: ANESTHESIOLOGY

## 2025-05-12 PROCEDURE — 31623 DX BRONCHOSCOPE/BRUSH: CPT | Performed by: INTERNAL MEDICINE

## 2025-05-12 PROCEDURE — 25010000002 ONDANSETRON PER 1 MG

## 2025-05-12 PROCEDURE — 31628 BRONCHOSCOPY/LUNG BX EACH: CPT | Performed by: INTERNAL MEDICINE

## 2025-05-12 PROCEDURE — 25010000002 DEXAMETHASONE PER 1 MG

## 2025-05-12 PROCEDURE — 25010000002 MIDAZOLAM PER 1MG: Performed by: ANESTHESIOLOGY

## 2025-05-12 PROCEDURE — 87205 SMEAR GRAM STAIN: CPT | Performed by: INTERNAL MEDICINE

## 2025-05-12 PROCEDURE — C1729 CATH, DRAINAGE: HCPCS | Performed by: INTERNAL MEDICINE

## 2025-05-12 PROCEDURE — 89051 BODY FLUID CELL COUNT: CPT | Performed by: INTERNAL MEDICINE

## 2025-05-12 PROCEDURE — 87071 CULTURE AEROBIC QUANT OTHER: CPT | Performed by: INTERNAL MEDICINE

## 2025-05-12 PROCEDURE — 88305 TISSUE EXAM BY PATHOLOGIST: CPT | Performed by: INTERNAL MEDICINE

## 2025-05-12 PROCEDURE — 87102 FUNGUS ISOLATION CULTURE: CPT | Performed by: INTERNAL MEDICINE

## 2025-05-12 PROCEDURE — 31629 BRONCHOSCOPY/NEEDLE BX EACH: CPT | Performed by: INTERNAL MEDICINE

## 2025-05-12 PROCEDURE — 31654 BRONCH EBUS IVNTJ PERPH LES: CPT | Performed by: INTERNAL MEDICINE

## 2025-05-12 PROCEDURE — 31652 BRONCH EBUS SAMPLNG 1/2 NODE: CPT | Performed by: INTERNAL MEDICINE

## 2025-05-12 PROCEDURE — 93005 ELECTROCARDIOGRAM TRACING: CPT | Performed by: ANESTHESIOLOGY

## 2025-05-12 PROCEDURE — 80048 BASIC METABOLIC PNL TOTAL CA: CPT | Performed by: ANESTHESIOLOGY

## 2025-05-12 PROCEDURE — 87633 RESP VIRUS 12-25 TARGETS: CPT | Performed by: INTERNAL MEDICINE

## 2025-05-12 PROCEDURE — 31627 NAVIGATIONAL BRONCHOSCOPY: CPT | Performed by: INTERNAL MEDICINE

## 2025-05-12 PROCEDURE — 87116 MYCOBACTERIA CULTURE: CPT | Performed by: INTERNAL MEDICINE

## 2025-05-12 PROCEDURE — 88108 CYTOPATH CONCENTRATE TECH: CPT | Performed by: INTERNAL MEDICINE

## 2025-05-12 PROCEDURE — 25010000002 LIDOCAINE PF 2% 2 % SOLUTION

## 2025-05-12 PROCEDURE — 25010000002 PROPOFOL 10 MG/ML EMULSION

## 2025-05-12 PROCEDURE — 25010000002 FENTANYL CITRATE (PF) 50 MCG/ML SOLUTION

## 2025-05-12 PROCEDURE — 76000 FLUOROSCOPY <1 HR PHYS/QHP: CPT

## 2025-05-12 PROCEDURE — 31624 DX BRONCHOSCOPE/LAVAGE: CPT | Performed by: INTERNAL MEDICINE

## 2025-05-12 RX ORDER — DEXAMETHASONE SODIUM PHOSPHATE 4 MG/ML
INJECTION, SOLUTION INTRA-ARTICULAR; INTRALESIONAL; INTRAMUSCULAR; INTRAVENOUS; SOFT TISSUE AS NEEDED
Status: DISCONTINUED | OUTPATIENT
Start: 2025-05-12 | End: 2025-05-12 | Stop reason: SURG

## 2025-05-12 RX ORDER — LIDOCAINE HYDROCHLORIDE 20 MG/ML
INJECTION, SOLUTION EPIDURAL; INFILTRATION; INTRACAUDAL; PERINEURAL AS NEEDED
Status: DISCONTINUED | OUTPATIENT
Start: 2025-05-12 | End: 2025-05-12 | Stop reason: SURG

## 2025-05-12 RX ORDER — ONDANSETRON 2 MG/ML
4 INJECTION INTRAMUSCULAR; INTRAVENOUS ONCE AS NEEDED
Status: DISCONTINUED | OUTPATIENT
Start: 2025-05-12 | End: 2025-05-16 | Stop reason: HOSPADM

## 2025-05-12 RX ORDER — EPHEDRINE SULFATE 50 MG/ML
INJECTION INTRAVENOUS AS NEEDED
Status: DISCONTINUED | OUTPATIENT
Start: 2025-05-12 | End: 2025-05-12 | Stop reason: SURG

## 2025-05-12 RX ORDER — ROCURONIUM BROMIDE 10 MG/ML
INJECTION, SOLUTION INTRAVENOUS AS NEEDED
Status: DISCONTINUED | OUTPATIENT
Start: 2025-05-12 | End: 2025-05-12 | Stop reason: SURG

## 2025-05-12 RX ORDER — FENTANYL CITRATE 50 UG/ML
INJECTION, SOLUTION INTRAMUSCULAR; INTRAVENOUS AS NEEDED
Status: DISCONTINUED | OUTPATIENT
Start: 2025-05-12 | End: 2025-05-12 | Stop reason: SURG

## 2025-05-12 RX ORDER — PROMETHAZINE HYDROCHLORIDE 25 MG/1
25 SUPPOSITORY RECTAL ONCE AS NEEDED
Status: DISCONTINUED | OUTPATIENT
Start: 2025-05-12 | End: 2025-05-16 | Stop reason: HOSPADM

## 2025-05-12 RX ORDER — MIDAZOLAM HYDROCHLORIDE 2 MG/2ML
2 INJECTION, SOLUTION INTRAMUSCULAR; INTRAVENOUS ONCE
Status: COMPLETED | OUTPATIENT
Start: 2025-05-12 | End: 2025-05-12

## 2025-05-12 RX ORDER — PHENYLEPHRINE HCL IN 0.9% NACL 1 MG/10 ML
SYRINGE (ML) INTRAVENOUS AS NEEDED
Status: DISCONTINUED | OUTPATIENT
Start: 2025-05-12 | End: 2025-05-12 | Stop reason: SURG

## 2025-05-12 RX ORDER — OXYCODONE HYDROCHLORIDE 5 MG/1
5 TABLET ORAL
Status: DISCONTINUED | OUTPATIENT
Start: 2025-05-12 | End: 2025-05-16 | Stop reason: HOSPADM

## 2025-05-12 RX ORDER — ONDANSETRON 2 MG/ML
INJECTION INTRAMUSCULAR; INTRAVENOUS AS NEEDED
Status: DISCONTINUED | OUTPATIENT
Start: 2025-05-12 | End: 2025-05-12 | Stop reason: SURG

## 2025-05-12 RX ORDER — SODIUM CHLORIDE, SODIUM LACTATE, POTASSIUM CHLORIDE, CALCIUM CHLORIDE 600; 310; 30; 20 MG/100ML; MG/100ML; MG/100ML; MG/100ML
9 INJECTION, SOLUTION INTRAVENOUS CONTINUOUS PRN
Status: DISCONTINUED | OUTPATIENT
Start: 2025-05-12 | End: 2025-05-13 | Stop reason: HOSPADM

## 2025-05-12 RX ORDER — PROPOFOL 10 MG/ML
VIAL (ML) INTRAVENOUS AS NEEDED
Status: DISCONTINUED | OUTPATIENT
Start: 2025-05-12 | End: 2025-05-12 | Stop reason: SURG

## 2025-05-12 RX ORDER — PROMETHAZINE HYDROCHLORIDE 25 MG/1
25 TABLET ORAL ONCE AS NEEDED
Status: DISCONTINUED | OUTPATIENT
Start: 2025-05-12 | End: 2025-05-16 | Stop reason: HOSPADM

## 2025-05-12 RX ADMIN — FENTANYL CITRATE 25 MCG: 50 INJECTION, SOLUTION INTRAMUSCULAR; INTRAVENOUS at 10:03

## 2025-05-12 RX ADMIN — SODIUM CHLORIDE, POTASSIUM CHLORIDE, SODIUM LACTATE AND CALCIUM CHLORIDE 9 ML/HR: 600; 310; 30; 20 INJECTION, SOLUTION INTRAVENOUS at 09:42

## 2025-05-12 RX ADMIN — LIDOCAINE HYDROCHLORIDE 40 MG: 20 INJECTION, SOLUTION INTRAVENOUS at 10:03

## 2025-05-12 RX ADMIN — ROCURONIUM BROMIDE 70 MG: 10 INJECTION, SOLUTION INTRAVENOUS at 10:03

## 2025-05-12 RX ADMIN — ROCURONIUM BROMIDE 10 MG: 10 INJECTION, SOLUTION INTRAVENOUS at 10:55

## 2025-05-12 RX ADMIN — ONDANSETRON 4 MG: 2 INJECTION INTRAMUSCULAR; INTRAVENOUS at 10:56

## 2025-05-12 RX ADMIN — SUGAMMADEX 200 MG: 100 INJECTION, SOLUTION INTRAVENOUS at 11:10

## 2025-05-12 RX ADMIN — DEXAMETHASONE SODIUM PHOSPHATE 4 MG: 4 INJECTION, SOLUTION INTRAMUSCULAR; INTRAVENOUS at 10:11

## 2025-05-12 RX ADMIN — PROPOFOL 150 MG: 10 INJECTION, EMULSION INTRAVENOUS at 10:03

## 2025-05-12 RX ADMIN — PROPOFOL 125 MCG/KG/MIN: 10 INJECTION, EMULSION INTRAVENOUS at 10:08

## 2025-05-12 RX ADMIN — EPHEDRINE SULFATE 10 MG: 50 INJECTION INTRAVENOUS at 10:46

## 2025-05-12 RX ADMIN — Medication 100 MCG: at 10:14

## 2025-05-12 RX ADMIN — Medication 100 MCG: at 10:29

## 2025-05-12 RX ADMIN — EPHEDRINE SULFATE 5 MG: 50 INJECTION INTRAVENOUS at 10:38

## 2025-05-12 RX ADMIN — MIDAZOLAM HYDROCHLORIDE 2 MG: 1 INJECTION, SOLUTION INTRAMUSCULAR; INTRAVENOUS at 09:43

## 2025-05-12 NOTE — INTERVAL H&P NOTE
Risks versus benefits of bronchoscopy explained to the patient including death, respiratory failure requiring intubation mechanical ventilation, pneumothorax requiring chest tube placement, bleeding.  Patient voices understanding of the risks of the procedure and wishes to proceed.  H&P reviewed. The patient was examined and there are no changes to the H&P.

## 2025-05-12 NOTE — OP NOTE
Robotic navigational assisted bronchoscopy with radial probe endobronchial ultrasound, transbronchial needle aspiration, transbronchial lung biopsies, brushings, bronchoalveolar lavage, bronchial washings.  Bronchoscopy with linear endobronchial ultrasound/fine-needle aspiration.  Bronchoscopy with clearance of airways     Pre-Operative Diagnosis: Right upper lobe lung mass     Post-Operative Diagnosis: Same     Timeout performed     Anesthesia: General anesthesia     Procedure Details: The patient was consented for the procedure with all risk and benefit of the procedure explained in detail.  Patient was given the opportunity to ask questions and all concerns were answered. The bronchoscope was inserted into the main airway via the endotracheal tube. An anatomical survey was done of the main airways and the subsegmental bronchus.     Findings: First, using fiberoptic bronchoscope airway inspection was performed.  Endotracheal tube was in adequate position in the mid thoracic trachea.  The trachea was normal in caliber and had no mucosal abnormalities. The left tracheobronchial tree appeared anatomically normal with no mucosal abnormalities. The right tracheobronchial tree appeared anatomically normal with no mucosal abnormalities.  All airways were evaluated and cleared.     Next the fiberoptic bronchoscope was removed and Ion robotic navigational bronchoscope was inserted into the endotracheal tube.  Using navigational guidance, we approach to the mass in the right upper lobe of lung.  Identification of right upper lobe lung mass and appropriate positioning was confirmed via radial probe EBUS and fluoroscopy.  Under fluoroscopic guidance, transbronchial needle aspiration was performed at the mass in the right upper lobe of the lung.  Radial probe EBUS was then inserted to again confirm appropriate position under fluoroscopic guidance.  Transbronchial lung biopsies were performed under fluoroscopic guidance at the  mass in the right upper lobe of the lung. Radial probe EBUS was then inserted to again confirm appropriate position under fluoroscopic guidance.  Brushings  were performed under fluoroscopic guidance at the mass of the right upper lobe bronchus. A bronchoalveolar lavage was performed right upper lobe bronchus  After Ion navigational bronchoscope was removed, linear endobronchial ultrasound was inserted through the endotracheal tube.  Samples from lymph node station 7, samples from lymph node station , samples from lymph node station 10 L obtained  Findings:  Mass in the right upper lobe     Estimated Blood Loss: Less than 10mL    Patient tolerated procedure well with no immediate complications     Specimens:  Transbronchial fine-needle aspiration of the right upper lobe of the lung  Transbronchial lung biopsy right upper lobe lung  Endobronchial brushing right upper lobe bronchus  Bronchoalveolar lavage right upper lobe bronchus  Endobronchial ultrasound guided transbronchial needle biopsy of lymph node stations 7,,10L

## 2025-05-12 NOTE — ANESTHESIA POSTPROCEDURE EVALUATION
Patient: Dipak Petty    Procedure Summary       Date: 05/12/25 Room / Location: [facility] OR 01 / [facility] MAIN OR    Anesthesia Start: 0958 Anesthesia Stop: 1125    Procedure: BRONCHOSCOPY WITH ION ROBOT (Throat) Diagnosis:       Lung nodule      Mediastinal adenopathy      (Lung nodule [R91.1])      (Mediastinal adenopathy [R59.0])    Surgeons: Rafael Briggs DO Provider: Austin Romero MD    Anesthesia Type: general ASA Status: 3            Anesthesia Type: general    Vitals  Vitals Value Taken Time   /63 05/12/25 11:55   Temp 36.7 °C (98 °F) 05/12/25 11:55   Pulse 59 05/12/25 11:55   Resp 16 05/12/25 11:55   SpO2 98 % 05/12/25 11:55           Post Anesthesia Care and Evaluation    Patient location during evaluation: bedside  Patient participation: complete - patient participated  Level of consciousness: awake  Pain management: adequate    Airway patency: patent  PONV Status: none  Cardiovascular status: acceptable and stable  Respiratory status: acceptable  Hydration status: acceptable

## 2025-05-12 NOTE — ANESTHESIA PREPROCEDURE EVALUATION
Anesthesia Evaluation     Patient summary reviewed and Nursing notes reviewed   no history of anesthetic complications:   NPO Solid Status: > 8 hours  NPO Liquid Status: > 2 hours           Airway   Mallampati: II  TM distance: >3 FB  Neck ROM: full  No difficulty expected  Dental    (+) edentulous    Pulmonary - normal exam    breath sounds clear to auscultation  (+) a smoker Current,  Cardiovascular - normal exam  Exercise tolerance: good (4-7 METS)    Rhythm: regular  Rate: normal    (+) hypertension (stopped meds due to angioedema, thought secondary to lisinopril)      Neuro/Psych- negative ROS  GI/Hepatic/Renal/Endo - negative ROS     Musculoskeletal     Abdominal    Substance History      OB/GYN          Other   arthritis,     ROS/Med Hx Other: He is currently holding off on blood pressure medication until he sees his cardiologist on 05/14/2025. The potential switch from lisinopril to losartan was discussed, noting that losartan is less likely to cause angioedema or cough.    No chest pain.  No prior cardiac history        Long nodules         Anesthesia Plan    ASA 3     general     (Patient understands anesthesia not responsible for dental damage.)  intravenous induction     Anesthetic plan, risks, benefits, and alternatives have been provided, discussed and informed consent has been obtained with: patient.    Plan discussed with CRNA.      CODE STATUS:

## 2025-05-14 LAB
BACTERIA SPEC AEROBE CULT: NORMAL
CYTO UR: NORMAL
CYTO UR: NORMAL
GRAM STN SPEC: NORMAL
LAB AP CASE REPORT: NORMAL
LAB AP CASE REPORT: NORMAL
LAB AP CLINICAL INFORMATION: NORMAL
LAB AP CLINICAL INFORMATION: NORMAL
LAB AP SPECIAL STAINS: NORMAL
PATH REPORT.FINAL DX SPEC: NORMAL
PATH REPORT.FINAL DX SPEC: NORMAL
PATH REPORT.GROSS SPEC: NORMAL
PATH REPORT.GROSS SPEC: NORMAL

## 2025-05-15 LAB
QT INTERVAL: 419 MS
QTC INTERVAL: 409 MS

## 2025-05-19 NOTE — PROGRESS NOTES
Primary Care Provider  Bhupendra Moses MD   Referring Provider  No ref. provider found    Patient Complaint  Follow-up, Results, and Cough    Patient or patient representative verbalized consent for the use of Ambient Listening during the visit with  AURELIANO Ambriz for chart documentation. 5/20/2025  14:20 EDT      Subjective       History of Presenting Illness  Dipak Petty is a pleasant 70 y.o. male  of  Dr. Briggs who presents to [facility] with history of abnormal CT scan, history of smoking, here for follow-up appointment after Ion robotic bronchoscopy and EBUS.  Patient underwent Ion robotic bronchoscopy with Dr. Briggs on 5/12/2025 due to lung nodule and mediastinal adenopathy.  Findings revealed a mass in the right upper lobe.  Cytology is negative for malignant cells from the right upper lobe BAL, right upper lobe mass FNA, lymph node station 10L and 7.  Pathology is positive for nonnecrotizing granulomatous inflammation and no treatment necessary.  Pneumonia panel was negative.  Bronchial wash with BAL differential negative, BAL culture negative, AFB and fungal cultures with no growth to date.      History of Present Illness  The patient is a 70-year-old male who presents for evaluation of necrotizing granulomatous inflammation.    He reports satisfactory respiratory function with no current issues. He is not on any medications. He has not been prescribed any medications and manages his condition by resting when he experiences shortness of breath. He experienced minor hemoptysis post-procedure, which resolved by the second day.    He had a consultation with a cardiologist two days prior, during which he underwent some tests and was informed that his cardiac function is normal.         At present time patient denies dyspnea, coughing, wheezing, headaches, chest pain, weight loss or hemoptysis. Patient denies fevers, chills and night sweats.  Dipak Petty is able to perform ADLs.      I have personally reviewed the review of systems, past family, social, medical and surgical histories; and agree with their findings.      Review of Systems   Constitutional: Negative.    HENT: Negative.     Respiratory: Negative.     Cardiovascular: Negative.    Musculoskeletal: Negative.    Neurological: Negative.    Psychiatric/Behavioral: Negative.           Family History   Problem Relation Age of Onset    Heart disease Mother     Cancer Father         Bladder and rare leukemia        Social History     Socioeconomic History    Marital status:    Tobacco Use    Smoking status: Every Day     Types: Pipe     Passive exposure: Past    Smokeless tobacco: Never    Tobacco comments:     used to smoke cigarettes now smoke a pipe   Vaping Use    Vaping status: Never Used    Passive vaping exposure: Yes   Substance and Sexual Activity    Alcohol use: Not Currently    Drug use: Never    Sexual activity: Defer        Past Medical History:   Diagnosis Date    Arthritis     knees, hips, shoulders    BPH (benign prostatic hyperplasia)     Erectile dysfunction     2, 3, 4 years long time    H/O Urinary tract infection 10/03/2022    Latest incident    Hypertension     FOLLOWS DR JUAREZ. DENIED CP-SOB    Lupus     Seasonal allergies         Immunization History   Administered Date(s) Administered    Influenza Injectable Mdck Pf Quad 10/22/2018       Allergies   Allergen Reactions    Lisinopril Swelling     Angioedema          Current Outpatient Medications:     amLODIPine (NORVASC) 2.5 MG tablet, Take 1 tablet by mouth Daily., Disp: , Rfl:     aspirin 81 MG EC tablet, Take 1 tablet by mouth Every Night., Disp: , Rfl:     EPINEPHrine (EPIPEN) 0.3 MG/0.3ML solution auto-injector injection, Inject 0.3 mL into the appropriate muscle as directed by prescriber See Admin Instructions. for allergic reaction, Disp: , Rfl:     hydroCHLOROthiazide (HYDRODIURIL) 12.5 MG tablet, Take  "1 tablet by mouth Daily., Disp: , Rfl:     montelukast (SINGULAIR) 10 MG tablet, Take 1 tablet by mouth every night at bedtime., Disp: , Rfl:          Vital Signs   /93 (BP Location: Right arm, Patient Position: Sitting, Cuff Size: Adult)   Pulse 82   Temp 96.4 °F (35.8 °C)   Resp 16   Ht 185.4 cm (72.99\")   Wt 93.4 kg (206 lb)   SpO2 97%   BMI 27.19 kg/m²       Objective     Physical Exam  Vitals reviewed.   Constitutional:       Appearance: Normal appearance.   HENT:      Head: Normocephalic and atraumatic.      Nose: Nose normal.      Mouth/Throat:      Mouth: Mucous membranes are moist.      Pharynx: Oropharynx is clear.   Eyes:      Extraocular Movements: Extraocular movements intact.      Conjunctiva/sclera: Conjunctivae normal.      Pupils: Pupils are equal, round, and reactive to light.   Cardiovascular:      Rate and Rhythm: Normal rate and regular rhythm.      Pulses: Normal pulses.      Heart sounds: Normal heart sounds.   Pulmonary:      Effort: Pulmonary effort is normal.      Breath sounds: Normal breath sounds.   Abdominal:      General: Bowel sounds are normal.   Musculoskeletal:         General: Normal range of motion.      Cervical back: Normal range of motion and neck supple.   Skin:     General: Skin is warm and dry.   Neurological:      Mental Status: He is alert and oriented to person, place, and time.   Psychiatric:         Behavior: Behavior normal.         Physical Exam  Lungs are clear.  Heart was examined.         Results Review  I have personally reviewed the prior office notes, hospital records, labs, and diagnostics.  Tissue Pathology Exam: [id number]  Order: [id number]   Status: Final result       Next appt: 05/20/2025 at 02:45 PM in Pulmonology (Karyn Colon, AURELIANO)       Dx: Lung nodule; Mediastinal adenopathy    Test Result Released: Yes (not seen)    Specimen Information: Lung, Right Upper Lobe; Tissue   2 Result Notes       View Follow-Up Encounter      Component  Ref " "Range & Units (hover)    Case Report   Surgical Pathology Report                         Case: [id number]                                   Authorizing Provider:  Rafael Briggs, Collected:           05/12/2025 10:36 AM                                  DO                                                                           Ordering Location:     [facility] MAIN Received:            05/13/2025 09:58 AM                                  OR                                                                           Pathologist:           Dana Andres DO                                                       Specimen:    Lung, Right Upper Lobe, RUL BIOPSIES                                                      Clinical Information    Lung nodule  Mediastinal adenopathy   Final Diagnosis   Lung, right upper lobe, core biopsy:               - Non-necrotizing granulomatous inflammation  - GMS stain, negative for fungal forms  - AFB stain, negative for acid-fast microorganisms    Electronically signed by Dana Andres DO on 5/14/2025 at 1411 EDT   Gross Description    1. Lung, Right Upper Lobe.  Received in formalin and labeled \" right upper lobe\" is a 0.5 cm aggregate of tan soft tissue fragments. The specimen is entirely submitted in one cassette.   ENRIQUETA   Special Stains    Special stains for AFB and GMS were performed to aid in the detection of acid-fast microorganisms and fungal forms, respectively. Controls are appropriate.       Microscopic Description    Microscopic examination performed.   Resulting Agency [facility]             Specimen Collected: 05/12/25 10:36 EDT Last Resulted: 05/14/25 14:11 EDT   Non-gynecologic Cytology: [id number]  Order: [id number]   Status: Final result       Next appt: 05/20/2025 at 02:45 PM in Pulmonology (AURELIANO Ambriz)       Dx: Lung nodule; Mediastinal adenopathy    Test Result Released: Yes (not seen)    Specimen Information: A: Lung, Right Upper " Lobe; Lavage    D: Lung, Right Upper Lobe; Aspirate    C: Lung, Right Upper Lobe; Brushing    E: Mediastinum; Lymph Node    F: Mediastinum; Lymph Node   2 Result Notes       View Follow-Up Encounter      Component  Ref Range & Units (hover)    Case Report   Medical Cytology Report                           Case: [id number]                                   Authorizing Provider:  Rafael Briggs, Collected:           05/12/2025 10:34 AM                                  DO                                                                           Ordering Location:     [facility] MAIN Received:            05/13/2025 09:47 AM                                  OR                                                                           Pathologist:           Dana Andres DO                                                       Specimens:   1) - Lung, Right Upper Lobe, RUL BAL                                                                2) - Lung, Right Upper Lobe, RUL BRUSHINGS                                                          3) - Lung, Right Upper Lobe, RUL ASPIRATE MASS NEEDLE ASPIRATE -CELL BLOCK                          4) - Mediastinum, STATION 10L NEEDLE ASPIRATES                                                      5) - Mediastinum, STATION 7 NEEDLE ASPIRATES                                              Final Diagnosis   1. Lung, right upper lobe, BAL:               - Negative for malignant cells         2. Lung, right upper lobe, bronchial brushing:               - Negative for malignant cells         3. Lung, right upper lobe mass, FNA:               - Negative for malignant cells         4. Lymph node, station 10L, FNA:               - Negative for malignant cells               - Lymphoid tissue present         5. Lymph node, station 7, FNA:               - Negative for malignant cells               - Lymphoid tissue present    Electronically signed by Dana Andres DO  on 5/14/2025 at 1439 EDT   Clinical Information    Right upper lobe lung mass.   Gross Description    1. Lung, Right Upper Lobe.  BAL, right upper lobe       16 cc of bloody, mucoid fluid received (1 cytospin prep prepared).     2. Lung, Right Upper Lobe.  Bronchial Brushings, right upper lobe       2 prepared smears received in ETOH.     3. Lung, Right Upper Lobe.  Ion-Guided Fine Needle Aspiration, right upper lobe mass       2 rapid Diff-Quik stained slides reviewed in OR by the cytotechnologist for the determination of cellular adequacy.  In addition to the 2 rapid Diff-Quik stained slides, 2 additional pap stained slides were collected in ETOH and a cytofixative vial containing multiple passes for cell block preparation are submitted for Cytology (a cell block is prepared in addition to the 4 total prepared smears).      4. Mediastinum.  BFNA, Station 10L Needle Aspirate       36 cc total volume in cytofixative received (1 cytospin prep and a cell block prepared).     5. Mediastinum.  BFNA, Station 7 Needle Aspirate       39 cc total volume in cytofixative received (1 cytospin prep and a cell block prepared).     Cell blocks placed in formalin: 13:34 EDT 5/13/2025, Neha SHEETS   Microscopic Description    Microscopic examination performed.   Resulting Agency [facility]             Specimen Collected: 05/12/25 10:34 EDT Last Resulted: 05/14/25 14:39 EDT   Pneumonia Panel - Lavage, Lung, Right Upper Lobe  Order: [id number]   Status: Final result       Next appt: 05/20/2025 at 02:45 PM in Pulmonology (AURELIANO Ambriz)       Dx: Lung nodule; Mediastinal adenopathy    Test Result Released: Yes (seen)    Specimen Information: Lung, Right Upper Lobe; Lavage   0 Result Notes      Component  Ref Range & Units (hover)    Escherichia coli PCR Not Detected   Acinetobacter calcoaceticus-baumannii complex PCR Not Detected   Enterobacter cloacae PCR Not Detected   Klebsiella oxytoca PCR Not Detected   Klebsiella pneumoniae  group PCR Not Detected   Klebsiella aerogenes PCR Not Detected   Moraxella catarrhalis PCR Not Detected   Proteus species PCR Not Detected   Pseudomonas aeroginosa PCR Not Detected   Serratia marcescens PCR Not Detected   Staphylococcus aureus PCR Not Detected   Streptococcus pyogenes PCR Not Detected   Haemophilus influenzae PCR Not Detected   Streptococcus agalactiae PCR Not Detected   Streptococcus pneumoniae PCR Not Detected   Chlamydophila pneumoniae PCR Not Detected   Legionella pneumophilia PCR Not Detected   Mycoplasma pneumo by PCR Not Detected   ADENOVIRUS, PCR Not Detected   CTX-M Gene N/A   IMP Gene N/A   KPC Gene N/A   mecA/C and MREJ Gene N/A   NDM Gene N/A   OXA-48-like Gene N/A   VIM Gene N/A   Coronavirus Not Detected   Human Metapneumovirus Not Detected   Human Rhinovirus/Enterovirus Not Detected   Influenza A PCR Not Detected   Influenza B PCR Not Detected   RSV, PCR Not Detected   Parainfluenza virus PCR Not Detected   Resulting Agency [facility]             Specimen Collected: 05/12/25 10:53 EDT Last Resulted: 05/12/25 12:43 EDT     Results  Laboratory Studies  Pneumonia panel was negative. White blood cells in the wash were normal. No growth in culture of the wash. AFB test was negative at 1 week. Fungal test was negative at 1 week. Pathology showed nonnecrotizing granulomatous inflammation. Cytology was negative for malignant cells. Lymph nodes were negative.          Assessment         Patient Active Problem List   Diagnosis    Gross hematuria    Benign prostatic hyperplasia with lower urinary tract symptoms    Erectile dysfunction due to arterial insufficiency    BPH with obstruction/lower urinary tract symptoms    Lung nodule    Mediastinal adenopathy        Plan     Diagnoses and all orders for this visit:    1. Lung nodule (Primary)    2. Mediastinal adenopathy    3. Necrotizing granulomatous inflammation of lung         Assessment & Plan  1. Necrotizing granulomatous inflammation.  The  patient's condition is stable with no current treatment required. The pneumonia panel returned negative results, indicating no presence of common pneumonias. The white blood cell count in the saline wash was within normal limits, and the culture showed no growth, which is a normal finding. The acid-fast bacilli (AFB) test, which screens for Mycobacterium and tuberculosis-type organisms, is currently negative at the one-week azeem. The fungal test is also negative at the one-week azeem. The pathology report confirmed nonnecrotizing granulomatous inflammation, as initially suspected. The cytology report was negative for malignant cells, and the lymph nodes tested were also negative. He reports no issues with breathing and is not on any medications. A pulmonary function test will be conducted to assess lung function and determine if it is contributing to any breathing difficulties.    2. Cardiac function.  He reports no known heart problems and recently saw a cardiologist who confirmed that all tests were normal.      Smoking status:  reports that he has been smoking pipe. He has been exposed to tobacco smoke. He has never used smokeless tobacco.    Vaccination status: Reviewed  Immunization History   Administered Date(s) Administered    Influenza Injectable Mdck Pf Quad 10/22/2018        Medications personally reviewed    Follow Up  Return in about 3 months (around 8/20/2025) for Dr. Briggs or Marielena Kwok NP.    Patient was given instructions and counseling regarding his condition or for health maintenance advice. Please see specific information pulled into the AVS if appropriate.     I spent 15 minutes caring for Dipak Petty on this date of service. This time includes time spent by me in the following activities:preparing for the visit, reviewing tests, obtaining and/or reviewing a separately obtained history, performing a medically appropriate examination and/or evaluation, counseling and educating the  patient/family/caregiver, ordering medications, tests, or procedures, documenting information in the medical record, independently interpreting results and communicating that information with the patient/family/caregiver and answered questions family members, discuss medications.

## 2025-05-20 ENCOUNTER — OFFICE VISIT (OUTPATIENT)
Dept: PULMONOLOGY | Facility: CLINIC | Age: 70
End: 2025-05-20
Payer: MEDICARE

## 2025-05-20 VITALS
HEIGHT: 73 IN | WEIGHT: 206 LBS | RESPIRATION RATE: 16 BRPM | DIASTOLIC BLOOD PRESSURE: 93 MMHG | HEART RATE: 82 BPM | OXYGEN SATURATION: 97 % | TEMPERATURE: 96.4 F | BODY MASS INDEX: 27.3 KG/M2 | SYSTOLIC BLOOD PRESSURE: 150 MMHG

## 2025-05-20 DIAGNOSIS — R91.1 LUNG NODULE: Primary | ICD-10-CM

## 2025-05-20 DIAGNOSIS — M31.30 NECROTIZING GRANULOMATOUS INFLAMMATION OF LUNG: ICD-10-CM

## 2025-05-20 DIAGNOSIS — R59.0 MEDIASTINAL ADENOPATHY: ICD-10-CM

## 2025-05-20 RX ORDER — AMLODIPINE BESYLATE 2.5 MG/1
2.5 TABLET ORAL DAILY
COMMUNITY
Start: 2025-05-14

## 2025-06-09 LAB — FUNGUS WND CULT: NORMAL

## 2025-06-17 ENCOUNTER — LAB (OUTPATIENT)
Dept: LAB | Facility: HOSPITAL | Age: 70
End: 2025-06-17
Payer: MEDICARE

## 2025-06-17 DIAGNOSIS — I15.9 SECONDARY HYPERTENSION: ICD-10-CM

## 2025-06-17 LAB
ALBUMIN SERPL-MCNC: 3.8 G/DL (ref 3.5–5.2)
ALBUMIN/GLOB SERPL: 1.1 G/DL
ALP SERPL-CCNC: 101 U/L (ref 39–117)
ALT SERPL W P-5'-P-CCNC: 19 U/L (ref 1–41)
ANION GAP SERPL CALCULATED.3IONS-SCNC: 9.4 MMOL/L (ref 5–15)
AST SERPL-CCNC: 31 U/L (ref 1–40)
BASOPHILS # BLD AUTO: 0.05 10*3/MM3 (ref 0–0.2)
BASOPHILS NFR BLD AUTO: 0.9 % (ref 0–1.5)
BILIRUB SERPL-MCNC: 0.4 MG/DL (ref 0–1.2)
BUN SERPL-MCNC: 18 MG/DL (ref 8–23)
BUN/CREAT SERPL: 14.2 (ref 7–25)
CALCIUM SPEC-SCNC: 9.5 MG/DL (ref 8.6–10.5)
CHLORIDE SERPL-SCNC: 103 MMOL/L (ref 98–107)
CO2 SERPL-SCNC: 25.6 MMOL/L (ref 22–29)
CREAT SERPL-MCNC: 1.27 MG/DL (ref 0.76–1.27)
DEPRECATED RDW RBC AUTO: 46.5 FL (ref 37–54)
EGFRCR SERPLBLD CKD-EPI 2021: 61.5 ML/MIN/1.73
EOSINOPHIL # BLD AUTO: 0.17 10*3/MM3 (ref 0–0.4)
EOSINOPHIL NFR BLD AUTO: 2.9 % (ref 0.3–6.2)
ERYTHROCYTE [DISTWIDTH] IN BLOOD BY AUTOMATED COUNT: 13.3 % (ref 12.3–15.4)
GLOBULIN UR ELPH-MCNC: 3.5 GM/DL
GLUCOSE SERPL-MCNC: 95 MG/DL (ref 65–99)
HCT VFR BLD AUTO: 44.8 % (ref 37.5–51)
HGB BLD-MCNC: 14.5 G/DL (ref 13–17.7)
IMM GRANULOCYTES # BLD AUTO: 0.01 10*3/MM3 (ref 0–0.05)
IMM GRANULOCYTES NFR BLD AUTO: 0.2 % (ref 0–0.5)
LYMPHOCYTES # BLD AUTO: 1.26 10*3/MM3 (ref 0.7–3.1)
LYMPHOCYTES NFR BLD AUTO: 21.7 % (ref 19.6–45.3)
MCH RBC QN AUTO: 30.7 PG (ref 26.6–33)
MCHC RBC AUTO-ENTMCNC: 32.4 G/DL (ref 31.5–35.7)
MCV RBC AUTO: 94.9 FL (ref 79–97)
MONOCYTES # BLD AUTO: 0.55 10*3/MM3 (ref 0.1–0.9)
MONOCYTES NFR BLD AUTO: 9.5 % (ref 5–12)
NEUTROPHILS NFR BLD AUTO: 3.77 10*3/MM3 (ref 1.7–7)
NEUTROPHILS NFR BLD AUTO: 64.8 % (ref 42.7–76)
NRBC BLD AUTO-RTO: 0 /100 WBC (ref 0–0.2)
PLATELET # BLD AUTO: 270 10*3/MM3 (ref 140–450)
PMV BLD AUTO: 10.8 FL (ref 6–12)
POTASSIUM SERPL-SCNC: 4.6 MMOL/L (ref 3.5–5.2)
PROT SERPL-MCNC: 7.3 G/DL (ref 6–8.5)
RBC # BLD AUTO: 4.72 10*6/MM3 (ref 4.14–5.8)
SODIUM SERPL-SCNC: 138 MMOL/L (ref 136–145)
WBC NRBC COR # BLD AUTO: 5.81 10*3/MM3 (ref 3.4–10.8)

## 2025-06-17 PROCEDURE — 85025 COMPLETE CBC W/AUTO DIFF WBC: CPT

## 2025-06-17 PROCEDURE — 36415 COLL VENOUS BLD VENIPUNCTURE: CPT

## 2025-06-17 PROCEDURE — 80053 COMPREHEN METABOLIC PANEL: CPT

## 2025-06-23 LAB
MYCOBACTERIUM SPEC CULT: NORMAL
NIGHT BLUE STAIN TISS: NORMAL
NIGHT BLUE STAIN TISS: NORMAL

## 2025-08-20 ENCOUNTER — OFFICE VISIT (OUTPATIENT)
Dept: PULMONOLOGY | Facility: CLINIC | Age: 70
End: 2025-08-20
Payer: MEDICARE

## 2025-08-20 VITALS
DIASTOLIC BLOOD PRESSURE: 93 MMHG | BODY MASS INDEX: 27.43 KG/M2 | RESPIRATION RATE: 16 BRPM | OXYGEN SATURATION: 96 % | HEIGHT: 73 IN | HEART RATE: 76 BPM | SYSTOLIC BLOOD PRESSURE: 140 MMHG | TEMPERATURE: 97.8 F | WEIGHT: 207 LBS

## 2025-08-20 DIAGNOSIS — R91.1 PULMONARY NODULE: Primary | ICD-10-CM

## 2025-08-20 PROCEDURE — 99213 OFFICE O/P EST LOW 20 MIN: CPT | Performed by: INTERNAL MEDICINE

## 2025-08-20 PROCEDURE — 1159F MED LIST DOCD IN RCRD: CPT | Performed by: INTERNAL MEDICINE

## 2025-08-20 PROCEDURE — 1160F RVW MEDS BY RX/DR IN RCRD: CPT | Performed by: INTERNAL MEDICINE

## (undated) DEVICE — TRAP,MUCUS SPECIMEN,40CC: Brand: MEDLINE

## (undated) DEVICE — CYSTO PACK: Brand: MEDLINE INDUSTRIES, INC.

## (undated) DEVICE — GLV SURG SENSICARE SLT PF LF 8 STRL

## (undated) DEVICE — SOL IRR H2O BO 100ML STRL

## (undated) DEVICE — STPCK 4WY ON/OFF VLV M/COLAR FIT 45PSI STRL

## (undated) DEVICE — CATHETER GUIDE

## (undated) DEVICE — CONTAINER,SPEC,PNEUM TUBE,3OZ,STRL PATH: Brand: MEDLINE

## (undated) DEVICE — CATH FOL SIMPLASTIC 3WY 24F 30CC

## (undated) DEVICE — MAT FLR ABS W/BLU/LINER 56X72IN WHT

## (undated) DEVICE — Y-TYPE TUR/BLADDER IRRIGATION SET, REGULATING CLAMP

## (undated) DEVICE — Device

## (undated) DEVICE — STY CATH CRV 6F

## (undated) DEVICE — SYR PREFIL W/SAL FLSH 10ML BX/30

## (undated) DEVICE — SINGLE USE ASPIRATION NEEDLE: Brand: SINGLE USE ASPIRATION NEEDLE

## (undated) DEVICE — SOL IRR NACL 0.9PCT 3000ML

## (undated) DEVICE — PK DRP AQUABEAM LITHO 65X69IN

## (undated) DEVICE — KT LINEN QUICKSQUITE SAHARA STD DRW/LIFT 60X78IN

## (undated) DEVICE — TRAY,IRRIGATION,BULBSYRINGE,60ML,CSR,PVP: Brand: MEDLINE

## (undated) DEVICE — SLV SCD KN/LEN ADJ EXPRSS BLENDED MD 1P/U

## (undated) DEVICE — BAG,DRAINAGE,4L,A/R TOWER,METAL CLAMP: Brand: MEDLINE

## (undated) DEVICE — REPROCESSING ACCESSORIES KIT

## (undated) DEVICE — SOL IRR NACL 0.9PCT BO 500ML

## (undated) DEVICE — SWIVEL CONNECTOR

## (undated) DEVICE — TRY CATH ABD ARROW DRN PARACNTSS LG/VOL 20G 18G 19CM

## (undated) DEVICE — METER,URINE,400ML,DRAIN BAG,L/F,LL,SLIDE: Brand: MEDLINE

## (undated) DEVICE — CATHETER: Brand: ION

## (undated) DEVICE — BASN EMESS 500ML GRY

## (undated) DEVICE — MAJ-1414 SINGLE USE ADPATER BIOPSY VALV: Brand: SINGLE USE ADAPTOR BIOPSY VALVE

## (undated) DEVICE — HF-RESECTION ELECTRODE PLASMALOOP LOOP, LARGE, 24 FR., 12°-30°, PK TURIS: Brand: OLYMPUS

## (undated) DEVICE — VISION PROBE: Brand: ION

## (undated) DEVICE — SYR SLP TP 10ML DISP

## (undated) DEVICE — DISPOSABLE BIOPSY FORCEPS: Brand: DISPOSABLE BIOPSY FORCEPS

## (undated) DEVICE — SYR LUERLOK 20CC BX/50

## (undated) DEVICE — REPROCESSING COVER

## (undated) DEVICE — SKIN PREP TRAY W/CHG: Brand: MEDLINE INDUSTRIES, INC.

## (undated) DEVICE — SOLIDIFIER LIQLOC PLS 1500CC BT

## (undated) DEVICE — SYR CATH/TIP 50ML 2OZ STRL 1P/U

## (undated) DEVICE — CATH FOLEY LUBRICATH 3WY 22F 30CC

## (undated) DEVICE — CATH FOL SIMPLASTIC 3WY 22F 30CC

## (undated) DEVICE — SENSOR CONNECTION CLEANER

## (undated) DEVICE — REPROCESSING CONSUMABLES KIT

## (undated) DEVICE — STPCK 1WY SPINLOCK FML LL NONDEHP LF .20ML

## (undated) DEVICE — SYR LUER SLPTP 50ML

## (undated) DEVICE — LINER SURG CANSTR SXN S/RIGD 1500CC

## (undated) DEVICE — GOWN ISOL OVR/HD TIE THMB/LP/CUF PE XLG BLU

## (undated) DEVICE — BIOPSY NEEDLE, 21G: Brand: FLEXISION

## (undated) DEVICE — VISION PROBE ADAPTER AND SUCTION ADAPTER

## (undated) DEVICE — SINGLE USE BIOPSY VALVE MAJ-210: Brand: SINGLE USE BIOPSY VALVE (STERILE)

## (undated) DEVICE — DISPOSABLE CYTOLOGY BRUSH: Brand: DISPOSABLE CYTOLOGY BRUSH

## (undated) DEVICE — STERILE ULTRASOUND GEL, SAFEWRAP: Brand: ECOVUE

## (undated) DEVICE — Device: Brand: AQUABEAM HANDPIECE

## (undated) DEVICE — SINGLE USE SUCTION VALVE MAJ-209: Brand: SINGLE USE SUCTION VALVE (STERILE)